# Patient Record
Sex: FEMALE | Race: BLACK OR AFRICAN AMERICAN | NOT HISPANIC OR LATINO | Employment: FULL TIME | URBAN - METROPOLITAN AREA
[De-identification: names, ages, dates, MRNs, and addresses within clinical notes are randomized per-mention and may not be internally consistent; named-entity substitution may affect disease eponyms.]

---

## 2024-07-10 ENCOUNTER — TELEMEDICINE (OUTPATIENT)
Dept: PRIMARY CARE | Facility: CLINIC | Age: 34
End: 2024-07-10
Payer: COMMERCIAL

## 2024-07-10 DIAGNOSIS — R09.89 CHEST CONGESTION: Primary | ICD-10-CM

## 2024-07-10 PROCEDURE — 99203 OFFICE O/P NEW LOW 30 MIN: CPT | Performed by: FAMILY MEDICINE

## 2024-07-10 RX ORDER — PREDNISONE 20 MG/1
40 TABLET ORAL DAILY
Qty: 10 TABLET | Refills: 0 | Status: SHIPPED | OUTPATIENT
Start: 2024-07-10 | End: 2024-07-15

## 2024-07-10 RX ORDER — ALBUTEROL SULFATE 90 UG/1
2 AEROSOL, METERED RESPIRATORY (INHALATION) EVERY 4 HOURS PRN
Qty: 8.5 G | Refills: 0 | Status: SHIPPED | OUTPATIENT
Start: 2024-07-10 | End: 2025-07-10

## 2024-07-10 RX ORDER — AMOXICILLIN AND CLAVULANATE POTASSIUM 875; 125 MG/1; MG/1
875 TABLET, FILM COATED ORAL 2 TIMES DAILY
Qty: 14 TABLET | Refills: 0 | Status: SHIPPED | OUTPATIENT
Start: 2024-07-10 | End: 2024-07-17

## 2024-07-10 NOTE — PROGRESS NOTES
S: chest congestion  Difficult to breath  Coughing with thick sputum  Wheezing  Feverish  Muscle aches  Tested twice for COVID at home: negative  Almost went to ER    Works as nurse, with sick contacts    O: 33 year old female  Appears tired.  Normal breathing pattern,  Walked in living room with no breathing difficulty    A/P.  Differential diagnosis : atypical pneumonia  RAD  Bronchitis with sinusitis   Encouraged to follow up with primary care with in 3 to 5days.

## 2024-08-30 ENCOUNTER — OFFICE VISIT (OUTPATIENT)
Dept: PRIMARY CARE | Facility: CLINIC | Age: 34
End: 2024-08-30
Payer: COMMERCIAL

## 2024-08-30 VITALS
BODY MASS INDEX: 34.78 KG/M2 | TEMPERATURE: 97.8 F | HEART RATE: 96 BPM | DIASTOLIC BLOOD PRESSURE: 78 MMHG | SYSTOLIC BLOOD PRESSURE: 120 MMHG | WEIGHT: 209 LBS | OXYGEN SATURATION: 99 %

## 2024-08-30 DIAGNOSIS — E28.2 PCOS (POLYCYSTIC OVARIAN SYNDROME): ICD-10-CM

## 2024-08-30 DIAGNOSIS — Z00.00 ADULT GENERAL MEDICAL EXAM: Primary | ICD-10-CM

## 2024-08-30 PROCEDURE — 99395 PREV VISIT EST AGE 18-39: CPT | Performed by: INTERNAL MEDICINE

## 2024-08-30 RX ORDER — BUSPIRONE HYDROCHLORIDE 10 MG/1
10 TABLET ORAL 2 TIMES DAILY
COMMUNITY
Start: 2024-08-01

## 2024-08-30 RX ORDER — METHYLPHENIDATE HYDROCHLORIDE 54 MG/1
54 TABLET ORAL EVERY MORNING
COMMUNITY

## 2024-08-30 RX ORDER — METFORMIN HYDROCHLORIDE 1000 MG/1
1000 TABLET ORAL
COMMUNITY
Start: 2024-07-05

## 2024-08-30 ASSESSMENT — PATIENT HEALTH QUESTIONNAIRE - PHQ9
SUM OF ALL RESPONSES TO PHQ9 QUESTIONS 1 AND 2: 0
1. LITTLE INTEREST OR PLEASURE IN DOING THINGS: NOT AT ALL
2. FEELING DOWN, DEPRESSED OR HOPELESS: NOT AT ALL

## 2024-08-30 ASSESSMENT — PAIN SCALES - GENERAL: PAINLEVEL: 0-NO PAIN

## 2024-08-30 NOTE — PROGRESS NOTES
Subjective   Patient ID: Graciela Maher is a 34 y.o. female who presents for Annual Exam.    HPI  1.  Physical exam.  Pt admits to high stress due to school, work, parenting.  Pap: last done 1/2018    Review of Systems  All pertinent positive symptoms are included in the history of present illness.    All other systems have been reviewed and are negative and noncontributory to this patient's current ailments.    Past Medical History:   Diagnosis Date    Other conditions influencing health status     Dysthymic Disorder    Personal history of other mental and behavioral disorders     History of attention deficit hyperactivity disorder     History reviewed. No pertinent surgical history.  Social History     Tobacco Use    Smoking status: Never    Smokeless tobacco: Never     No family history on file.  No Known Allergies  Immunization History   Administered Date(s) Administered    Pfizer Gray Cap SARS-CoV-2 04/05/2022     Current Outpatient Medications   Medication Instructions    busPIRone (BUSPAR) 10 mg, oral, 2 times daily    metFORMIN (GLUCOPHAGE) 1,000 mg, oral, Daily with breakfast    methylphenidate ER 54 mg, oral, Every morning     Objective   Visit Vitals  /78   Pulse 96   Temp 36.6 °C (97.8 °F)   Wt 94.8 kg (209 lb)   SpO2 99%   BMI 34.78 kg/m²   Smoking Status Never   BSA 2.09 m²     No visits with results within 1 Month(s) from this visit.   Latest known visit with results is:   Legacy Encounter on 03/23/2023   Component Date Value    ANTI-NUCLEAR ANTIBODY (A* 03/23/2023                      Value:NEGATIVE  Reference range: NEGATIVE    Anti-nuclear antibody test is used as an aid in diagnosis of systemic  autoimmune diseases. Where positive and clinically warranted,  follow-up using disease-specific testing is recommended. Low positive  titers are not uncommon with advanced age, certain chronic  infections, and malignancies among others.     Test methodology: Indirect fluorescence immunoassay  (IFA) using HEp-2  cells.  Performed By:  [facility]  [address] [place] [address]  : MD KIMBERLY Farooq III#: [id number]  Phone#: [phone]      Calcium 03/23/2023 9.5     AST 03/23/2023 18     Alkaline Phosphatase 03/23/2023 78     Total Bilirubin 03/23/2023 0.6     Total Protein 03/23/2023 7.9     Albumin 03/23/2023 4.3     Globulin, Total 03/23/2023 3.6     A/G Ratio 03/23/2023 1.2 (L)     Sodium 03/23/2023 139     Potassium 03/23/2023 4.1     Chloride 03/23/2023 102     Bicarbonate 03/23/2023 22 (L)     Anion Gap 03/23/2023 15     Urea Nitrogen 03/23/2023 8     Creatinine 03/23/2023 0.7     Urea Nitrogen/Creatinine* 03/23/2023 11.4     Glucose 03/23/2023 108 (H)     ALT (SGPT) 03/23/2023 21     ESTIMATED GFR 03/23/2023 117     CRP 03/23/2023 0.4     DHEA Sulfate 03/23/2023                      Value:304  Reference range: 12  to  379  Unit: ug/dL    MATURITY-BASED REFERENCE RANGES:        PUBERTAL (SIMA) STAGE    MALE      FEMALE       ---------------------------------------------------                 I           5 - 265     5 - 125                  II          15 - 380    15 - 150                 III          60 - 505    20 - 535                            IV          65 - 560    35 - 485                      V         165 - 500    75 - 530       Biotin interference may cause falsely elevated results.   Patients taking a Biotin dose of up to 5 mg/day should   refrain from taking Biotin for 24 hours before sample   collection. Providers may contact their local laboratory  for further information.  Test Performed at:  [facility]), [email or web address] , [place], [place], [address]  :         Differential Type 03/23/2023 AUTO DIFF     Immature Granulocyte %, * 03/23/2023 0.50     Neutrophil 03/23/2023 65.20     Lymphocytes % 03/23/2023 27.80     Monocytes % 03/23/2023 5.20     Eosinophil 03/23/2023 0.70     Basophils % 03/23/2023 0.60     Immature  Granulocytes Ab* 03/23/2023 0.05     Neutrophils Absolute 03/23/2023 6.63     Lymphocytes Absolute 03/23/2023 2.83     Monocytes Absolute 03/23/2023 0.53     Eosinophils Absolute 03/23/2023 0.07     Basophils Absolute 03/23/2023 0.06     WBC 03/23/2023 10.2     RBC 03/23/2023 4.96 (H)     Hemoglobin 03/23/2023 14.1     Hematocrit 03/23/2023 44.9 (H)     MCV 03/23/2023 90.5     MCH 03/23/2023 28.4     MCHC 03/23/2023 31.4     RDW-SD 03/23/2023 40.4     RDW-CV 03/23/2023 12.3     Platelets 03/23/2023 260     MPV 03/23/2023 12.2     nRBC 03/23/2023 0     ABSOLUTE NEUTROPHIL CALC* 03/23/2023 6.63     Hemoglobin A1C 03/23/2023 5.7     Insulin 03/23/2023  (H)                     Value:30   Reference values apply to fasting specimens.  Reference range: 3  to  25  Unit: uIU/mL      SERUM COLOR 03/23/2023 YELLOW     SERUM CLARITY 03/23/2023 CLEAR     Fasting status 03/23/2023 FASTING     Cholesterol 03/23/2023 252 (H)     Triglycerides 03/23/2023 175 (H)     HDL 03/23/2023 51     LDL Calculated 03/23/2023 166 (H)     Cholesterol/HDL Ratio 03/23/2023 4.9     Rheumatoid Factor 03/23/2023 10     Testosterone, Total 03/23/2023  (H)                     Value:86  Reference range: 2 to 45  Unit: ng/dL    For additional information, please refer to  http://education.Aledia/faq/  [id number]  (This link is being provided for informational/  educational purposes only.)     This test was developed and its analytical performance  characteristics have been determined by [employer] [place], [place]. It has  not been cleared or approved by the U.S. Food and Drug  Administration. This assay has been validated pursuant  to the CLIA regulations and is used for clinical  purposes.      Testosterone, Free 03/23/2023  (H)                     Value:14.2  Reference range: 0.1 to 6.4  Unit: pg/mL    This test was developed and its analytical performance  characteristics have been determined by  [employer] [employer] [place], [place]. It has  not been cleared or approved by the U.S. Food and Drug  Administration. This assay has been validated pursuant  to the CLIA regulations and is used for clinical  purposes.  Test Performed at:  ([id number]), , , ,   :         Thyroid Stimulating Horm* 03/23/2023 1.18      Physical Exam  CONSTITUTIONAL - well nourished, well developed, looks like stated age, in no acute distress, not ill-appearing, and not tired appearing  SKIN - normal skin color and pigmentation, normal skin turgor without rash, lesions, or nodules visualized  HEAD - no trauma, normocephalic  EYES - pupils are equal and reactive to light, extraocular muscles are intact, and normal external exam  ENT - TM's intact, no injection, no signs of infection, uvula midline, normal tongue movement and throat normal, no exudate, nasal passage without discharge and patent  NECK - supple without rigidity, no neck mass was observed, no thyromegaly or thyroid nodules  CHEST - clear to auscultation, no wheezing, no crackles and no rales, good effort  CARDIAC - regular rate and regular rhythm, no skipped beats, no murmur  ABDOMEN - no organomegaly, soft, nontender, nondistended, normal bowel sounds, no guarding/rebound/rigidity, negative McBurney sign and negative Rosa sign  EXTREMITIES - no edema, no deformities  NEUROLOGICAL - normal gait, normal balance, normal motor, no ataxia; alert, oriented and no focal signs  PSYCHIATRIC - alert, pleasant and cordial, age-appropriate  IMMUNOLOGIC - no cervical lymphadenopathy    Assessment/Plan   Problem List Items Addressed This Visit    None  Visit Diagnoses       Adult general medical exam    -  Primary    PCOS (polycystic ovarian syndrome)        Relevant Orders    Lipid Panel    Comprehensive Metabolic Panel    CBC    TSH with reflex to Free T4 if abnormal    Hemoglobin A1C         Healthy diet and exercise encouraged. Low fat, low salt  diet. Drink plenty of fluids. Exercise at least 5 times/week for at least 45 minutes per day.

## 2025-04-07 ENCOUNTER — HOSPITAL ENCOUNTER (INPATIENT)
Facility: HOSPITAL | Age: 35
LOS: 2 days | Discharge: HOME | End: 2025-04-09
Attending: EMERGENCY MEDICINE | Admitting: INTERNAL MEDICINE

## 2025-04-07 ENCOUNTER — APPOINTMENT (OUTPATIENT)
Dept: RADIOLOGY | Facility: HOSPITAL | Age: 35
End: 2025-04-07

## 2025-04-07 ENCOUNTER — APPOINTMENT (OUTPATIENT)
Dept: CARDIOLOGY | Facility: HOSPITAL | Age: 35
End: 2025-04-07

## 2025-04-07 DIAGNOSIS — K52.9 ENTEROCOLITIS: ICD-10-CM

## 2025-04-07 DIAGNOSIS — R10.84 GENERALIZED ABDOMINAL PAIN: ICD-10-CM

## 2025-04-07 DIAGNOSIS — R11.2 NAUSEA AND VOMITING, UNSPECIFIED VOMITING TYPE: ICD-10-CM

## 2025-04-07 DIAGNOSIS — K52.9 GASTROENTERITIS: Primary | ICD-10-CM

## 2025-04-07 LAB
ALBUMIN SERPL BCP-MCNC: 4.4 G/DL (ref 3.4–5)
ALP SERPL-CCNC: 70 U/L (ref 33–110)
ALT SERPL W P-5'-P-CCNC: 19 U/L (ref 7–45)
ANION GAP SERPL CALC-SCNC: 12 MMOL/L (ref 10–20)
APPEARANCE UR: ABNORMAL
AST SERPL W P-5'-P-CCNC: 14 U/L (ref 9–39)
B-HCG SERPL-ACNC: <2 MIU/ML
BACTERIA #/AREA URNS AUTO: ABNORMAL /HPF
BASOPHILS # BLD AUTO: 0.04 X10*3/UL (ref 0–0.1)
BASOPHILS NFR BLD AUTO: 0.2 %
BILIRUB SERPL-MCNC: 0.8 MG/DL (ref 0–1.2)
BILIRUB UR STRIP.AUTO-MCNC: NEGATIVE MG/DL
BUN SERPL-MCNC: 10 MG/DL (ref 6–23)
CALCIUM SERPL-MCNC: 9 MG/DL (ref 8.6–10.3)
CHLORIDE SERPL-SCNC: 103 MMOL/L (ref 98–107)
CO2 SERPL-SCNC: 25 MMOL/L (ref 21–32)
COLOR UR: YELLOW
CREAT SERPL-MCNC: 0.66 MG/DL (ref 0.5–1.05)
EGFRCR SERPLBLD CKD-EPI 2021: >90 ML/MIN/1.73M*2
EOSINOPHIL # BLD AUTO: 0.02 X10*3/UL (ref 0–0.7)
EOSINOPHIL NFR BLD AUTO: 0.1 %
ERYTHROCYTE [DISTWIDTH] IN BLOOD BY AUTOMATED COUNT: 12.7 % (ref 11.5–14.5)
FLUAV RNA RESP QL NAA+PROBE: NOT DETECTED
FLUBV RNA RESP QL NAA+PROBE: NOT DETECTED
GLUCOSE SERPL-MCNC: 153 MG/DL (ref 74–99)
GLUCOSE UR STRIP.AUTO-MCNC: NORMAL MG/DL
HCT VFR BLD AUTO: 45.9 % (ref 36–46)
HGB BLD-MCNC: 14.4 G/DL (ref 12–16)
HOLD SPECIMEN: NORMAL
HOLD SPECIMEN: NORMAL
IMM GRANULOCYTES # BLD AUTO: 0.11 X10*3/UL (ref 0–0.7)
IMM GRANULOCYTES NFR BLD AUTO: 0.6 % (ref 0–0.9)
KETONES UR STRIP.AUTO-MCNC: NEGATIVE MG/DL
LACTATE SERPL-SCNC: 1.5 MMOL/L (ref 0.4–2)
LACTATE SERPL-SCNC: 2.4 MMOL/L (ref 0.4–2)
LACTATE SERPL-SCNC: 2.5 MMOL/L (ref 0.4–2)
LACTATE SERPL-SCNC: 2.6 MMOL/L (ref 0.4–2)
LACTATE SERPL-SCNC: 2.9 MMOL/L (ref 0.4–2)
LEUKOCYTE ESTERASE UR QL STRIP.AUTO: ABNORMAL
LIPASE SERPL-CCNC: 28 U/L (ref 9–82)
LYMPHOCYTES # BLD AUTO: 0.4 X10*3/UL (ref 1.2–4.8)
LYMPHOCYTES NFR BLD AUTO: 2.1 %
MAGNESIUM SERPL-MCNC: 1.77 MG/DL (ref 1.6–2.4)
MCH RBC QN AUTO: 28.8 PG (ref 26–34)
MCHC RBC AUTO-ENTMCNC: 31.4 G/DL (ref 32–36)
MCV RBC AUTO: 92 FL (ref 80–100)
MONOCYTES # BLD AUTO: 0.65 X10*3/UL (ref 0.1–1)
MONOCYTES NFR BLD AUTO: 3.4 %
MUCOUS THREADS #/AREA URNS AUTO: ABNORMAL /LPF
NEUTROPHILS # BLD AUTO: 18.04 X10*3/UL (ref 1.2–7.7)
NEUTROPHILS NFR BLD AUTO: 93.6 %
NITRITE UR QL STRIP.AUTO: NEGATIVE
NRBC BLD-RTO: 0 /100 WBCS (ref 0–0)
PH UR STRIP.AUTO: 5.5 [PH]
PLATELET # BLD AUTO: 335 X10*3/UL (ref 150–450)
POTASSIUM SERPL-SCNC: 4.4 MMOL/L (ref 3.5–5.3)
PROT SERPL-MCNC: 7.6 G/DL (ref 6.4–8.2)
PROT UR STRIP.AUTO-MCNC: ABNORMAL MG/DL
RBC # BLD AUTO: 5 X10*6/UL (ref 4–5.2)
RBC # UR STRIP.AUTO: NEGATIVE MG/DL
RBC #/AREA URNS AUTO: ABNORMAL /HPF
RSV RNA RESP QL NAA+PROBE: NOT DETECTED
SARS-COV-2 RNA RESP QL NAA+PROBE: NOT DETECTED
SODIUM SERPL-SCNC: 136 MMOL/L (ref 136–145)
SP GR UR STRIP.AUTO: 1.03
SQUAMOUS #/AREA URNS AUTO: ABNORMAL /HPF
UROBILINOGEN UR STRIP.AUTO-MCNC: NORMAL MG/DL
WBC # BLD AUTO: 19.3 X10*3/UL (ref 4.4–11.3)
WBC #/AREA URNS AUTO: ABNORMAL /HPF
WBC CLUMPS #/AREA URNS AUTO: ABNORMAL /HPF

## 2025-04-07 PROCEDURE — 96365 THER/PROPH/DIAG IV INF INIT: CPT

## 2025-04-07 PROCEDURE — 96374 THER/PROPH/DIAG INJ IV PUSH: CPT

## 2025-04-07 PROCEDURE — 93005 ELECTROCARDIOGRAM TRACING: CPT

## 2025-04-07 PROCEDURE — 2500000002 HC RX 250 W HCPCS SELF ADMINISTERED DRUGS (ALT 637 FOR MEDICARE OP, ALT 636 FOR OP/ED)

## 2025-04-07 PROCEDURE — 99223 1ST HOSP IP/OBS HIGH 75: CPT

## 2025-04-07 PROCEDURE — 96375 TX/PRO/DX INJ NEW DRUG ADDON: CPT

## 2025-04-07 PROCEDURE — 36415 COLL VENOUS BLD VENIPUNCTURE: CPT | Performed by: NURSE PRACTITIONER

## 2025-04-07 PROCEDURE — 2550000001 HC RX 255 CONTRASTS: Performed by: NURSE PRACTITIONER

## 2025-04-07 PROCEDURE — 81001 URINALYSIS AUTO W/SCOPE: CPT | Performed by: NURSE PRACTITIONER

## 2025-04-07 PROCEDURE — 80053 COMPREHEN METABOLIC PANEL: CPT | Performed by: NURSE PRACTITIONER

## 2025-04-07 PROCEDURE — 87040 BLOOD CULTURE FOR BACTERIA: CPT | Mod: PARLAB | Performed by: NURSE PRACTITIONER

## 2025-04-07 PROCEDURE — 2500000004 HC RX 250 GENERAL PHARMACY W/ HCPCS (ALT 636 FOR OP/ED): Performed by: NURSE PRACTITIONER

## 2025-04-07 PROCEDURE — 74177 CT ABD & PELVIS W/CONTRAST: CPT | Performed by: RADIOLOGY

## 2025-04-07 PROCEDURE — 74177 CT ABD & PELVIS W/CONTRAST: CPT

## 2025-04-07 PROCEDURE — 83605 ASSAY OF LACTIC ACID: CPT | Performed by: INTERNAL MEDICINE

## 2025-04-07 PROCEDURE — 83735 ASSAY OF MAGNESIUM: CPT | Performed by: NURSE PRACTITIONER

## 2025-04-07 PROCEDURE — 87637 SARSCOV2&INF A&B&RSV AMP PRB: CPT | Performed by: NURSE PRACTITIONER

## 2025-04-07 PROCEDURE — 83605 ASSAY OF LACTIC ACID: CPT

## 2025-04-07 PROCEDURE — 85025 COMPLETE CBC W/AUTO DIFF WBC: CPT | Performed by: NURSE PRACTITIONER

## 2025-04-07 PROCEDURE — 2500000001 HC RX 250 WO HCPCS SELF ADMINISTERED DRUGS (ALT 637 FOR MEDICARE OP)

## 2025-04-07 PROCEDURE — 87086 URINE CULTURE/COLONY COUNT: CPT | Mod: PARLAB | Performed by: NURSE PRACTITIONER

## 2025-04-07 PROCEDURE — 2500000004 HC RX 250 GENERAL PHARMACY W/ HCPCS (ALT 636 FOR OP/ED)

## 2025-04-07 PROCEDURE — 1200000002 HC GENERAL ROOM WITH TELEMETRY DAILY

## 2025-04-07 PROCEDURE — 84702 CHORIONIC GONADOTROPIN TEST: CPT | Performed by: NURSE PRACTITIONER

## 2025-04-07 PROCEDURE — 96361 HYDRATE IV INFUSION ADD-ON: CPT

## 2025-04-07 PROCEDURE — 83690 ASSAY OF LIPASE: CPT | Performed by: NURSE PRACTITIONER

## 2025-04-07 PROCEDURE — 83605 ASSAY OF LACTIC ACID: CPT | Performed by: NURSE PRACTITIONER

## 2025-04-07 PROCEDURE — 99285 EMERGENCY DEPT VISIT HI MDM: CPT | Mod: 25 | Performed by: EMERGENCY MEDICINE

## 2025-04-07 RX ORDER — ACETAMINOPHEN 160 MG/5ML
650 SOLUTION ORAL EVERY 4 HOURS PRN
Status: DISCONTINUED | OUTPATIENT
Start: 2025-04-07 | End: 2025-04-09 | Stop reason: HOSPADM

## 2025-04-07 RX ORDER — MORPHINE SULFATE 2 MG/ML
2 INJECTION, SOLUTION INTRAMUSCULAR; INTRAVENOUS ONCE
Status: COMPLETED | OUTPATIENT
Start: 2025-04-07 | End: 2025-04-07

## 2025-04-07 RX ORDER — KETOROLAC TROMETHAMINE 30 MG/ML
15 INJECTION, SOLUTION INTRAMUSCULAR; INTRAVENOUS ONCE
Status: COMPLETED | OUTPATIENT
Start: 2025-04-07 | End: 2025-04-07

## 2025-04-07 RX ORDER — PROCHLORPERAZINE EDISYLATE 5 MG/ML
10 INJECTION INTRAMUSCULAR; INTRAVENOUS EVERY 6 HOURS PRN
Status: DISCONTINUED | OUTPATIENT
Start: 2025-04-07 | End: 2025-04-09 | Stop reason: HOSPADM

## 2025-04-07 RX ORDER — ONDANSETRON 4 MG/1
4 TABLET, FILM COATED ORAL EVERY 8 HOURS PRN
COMMUNITY

## 2025-04-07 RX ORDER — ENOXAPARIN SODIUM 100 MG/ML
40 INJECTION SUBCUTANEOUS EVERY 24 HOURS
Status: DISCONTINUED | OUTPATIENT
Start: 2025-04-07 | End: 2025-04-09 | Stop reason: HOSPADM

## 2025-04-07 RX ORDER — ONDANSETRON HYDROCHLORIDE 2 MG/ML
4 INJECTION, SOLUTION INTRAVENOUS ONCE
Status: COMPLETED | OUTPATIENT
Start: 2025-04-07 | End: 2025-04-07

## 2025-04-07 RX ORDER — PROCHLORPERAZINE EDISYLATE 5 MG/ML
5 INJECTION INTRAMUSCULAR; INTRAVENOUS ONCE
Status: DISCONTINUED | OUTPATIENT
Start: 2025-04-07 | End: 2025-04-07

## 2025-04-07 RX ORDER — PROCHLORPERAZINE 25 MG/1
25 SUPPOSITORY RECTAL EVERY 12 HOURS PRN
Status: DISCONTINUED | OUTPATIENT
Start: 2025-04-07 | End: 2025-04-09 | Stop reason: HOSPADM

## 2025-04-07 RX ORDER — PROCHLORPERAZINE MALEATE 10 MG
10 TABLET ORAL EVERY 6 HOURS PRN
Status: DISCONTINUED | OUTPATIENT
Start: 2025-04-07 | End: 2025-04-09 | Stop reason: HOSPADM

## 2025-04-07 RX ORDER — ACETAMINOPHEN 325 MG/1
650 TABLET ORAL EVERY 4 HOURS PRN
Status: DISCONTINUED | OUTPATIENT
Start: 2025-04-07 | End: 2025-04-09 | Stop reason: HOSPADM

## 2025-04-07 RX ORDER — SODIUM CHLORIDE 9 MG/ML
125 INJECTION, SOLUTION INTRAVENOUS CONTINUOUS
Status: ACTIVE | OUTPATIENT
Start: 2025-04-07 | End: 2025-04-08

## 2025-04-07 RX ORDER — PROMETHAZINE HYDROCHLORIDE 25 MG/1
25 TABLET ORAL EVERY 6 HOURS PRN
Status: DISCONTINUED | OUTPATIENT
Start: 2025-04-07 | End: 2025-04-09 | Stop reason: HOSPADM

## 2025-04-07 RX ORDER — KETOROLAC TROMETHAMINE 30 MG/ML
15 INJECTION, SOLUTION INTRAMUSCULAR; INTRAVENOUS ONCE
Status: DISCONTINUED | OUTPATIENT
Start: 2025-04-07 | End: 2025-04-07

## 2025-04-07 RX ORDER — SODIUM CHLORIDE 9 MG/ML
125 INJECTION, SOLUTION INTRAVENOUS CONTINUOUS
Status: DISCONTINUED | OUTPATIENT
Start: 2025-04-07 | End: 2025-04-07

## 2025-04-07 RX ORDER — PROMETHAZINE HYDROCHLORIDE 25 MG/1
25 SUPPOSITORY RECTAL EVERY 12 HOURS PRN
Status: DISCONTINUED | OUTPATIENT
Start: 2025-04-07 | End: 2025-04-09 | Stop reason: HOSPADM

## 2025-04-07 RX ADMIN — MORPHINE SULFATE 2 MG: 2 INJECTION, SOLUTION INTRAMUSCULAR; INTRAVENOUS at 12:33

## 2025-04-07 RX ADMIN — KETOROLAC TROMETHAMINE 15 MG: 30 INJECTION, SOLUTION INTRAMUSCULAR at 10:22

## 2025-04-07 RX ADMIN — SODIUM CHLORIDE 1000 ML: 9 INJECTION, SOLUTION INTRAVENOUS at 08:50

## 2025-04-07 RX ADMIN — PIPERACILLIN SODIUM AND TAZOBACTAM SODIUM 3.38 G: 3; .375 INJECTION, SOLUTION INTRAVENOUS at 22:12

## 2025-04-07 RX ADMIN — SODIUM CHLORIDE 125 ML/HR: 0.9 INJECTION, SOLUTION INTRAVENOUS at 10:09

## 2025-04-07 RX ADMIN — PIPERACILLIN SODIUM AND TAZOBACTAM SODIUM 3.38 G: 3; .375 INJECTION, SOLUTION INTRAVENOUS at 15:31

## 2025-04-07 RX ADMIN — PROMETHAZINE HYDROCHLORIDE 12.5 MG: 25 INJECTION INTRAMUSCULAR; INTRAVENOUS at 12:56

## 2025-04-07 RX ADMIN — SODIUM CHLORIDE 125 ML/HR: 0.9 INJECTION, SOLUTION INTRAVENOUS at 16:31

## 2025-04-07 RX ADMIN — ONDANSETRON 4 MG: 2 INJECTION INTRAMUSCULAR; INTRAVENOUS at 09:11

## 2025-04-07 RX ADMIN — ACETAMINOPHEN 650 MG: 325 TABLET, FILM COATED ORAL at 16:03

## 2025-04-07 RX ADMIN — SODIUM CHLORIDE 1000 ML: 0.9 INJECTION, SOLUTION INTRAVENOUS at 13:35

## 2025-04-07 RX ADMIN — PIPERACILLIN SODIUM AND TAZOBACTAM SODIUM 3.38 G: 3; .375 INJECTION, SOLUTION INTRAVENOUS at 10:30

## 2025-04-07 RX ADMIN — IOHEXOL 75 ML: 350 INJECTION, SOLUTION INTRAVENOUS at 11:05

## 2025-04-07 RX ADMIN — ACETAMINOPHEN 650 MG: 325 TABLET, FILM COATED ORAL at 20:48

## 2025-04-07 RX ADMIN — ENOXAPARIN SODIUM 40 MG: 40 INJECTION SUBCUTANEOUS at 14:52

## 2025-04-07 RX ADMIN — PROMETHAZINE HYDROCHLORIDE 25 MG: 25 TABLET ORAL at 16:04

## 2025-04-07 SDOH — SOCIAL STABILITY: SOCIAL INSECURITY: ARE THERE ANY APPARENT SIGNS OF INJURIES/BEHAVIORS THAT COULD BE RELATED TO ABUSE/NEGLECT?: NO

## 2025-04-07 SDOH — ECONOMIC STABILITY: INCOME INSECURITY: IN THE PAST 12 MONTHS HAS THE ELECTRIC, GAS, OIL, OR WATER COMPANY THREATENED TO SHUT OFF SERVICES IN YOUR HOME?: NO

## 2025-04-07 SDOH — SOCIAL STABILITY: SOCIAL INSECURITY: DO YOU FEEL ANYONE HAS EXPLOITED OR TAKEN ADVANTAGE OF YOU FINANCIALLY OR OF YOUR PERSONAL PROPERTY?: NO

## 2025-04-07 SDOH — ECONOMIC STABILITY: FOOD INSECURITY: WITHIN THE PAST 12 MONTHS, THE FOOD YOU BOUGHT JUST DIDN'T LAST AND YOU DIDN'T HAVE MONEY TO GET MORE.: NEVER TRUE

## 2025-04-07 SDOH — SOCIAL STABILITY: SOCIAL INSECURITY: WITHIN THE LAST YEAR, HAVE YOU BEEN HUMILIATED OR EMOTIONALLY ABUSED IN OTHER WAYS BY YOUR PARTNER OR EX-PARTNER?: NO

## 2025-04-07 SDOH — ECONOMIC STABILITY: FOOD INSECURITY: WITHIN THE PAST 12 MONTHS, YOU WORRIED THAT YOUR FOOD WOULD RUN OUT BEFORE YOU GOT THE MONEY TO BUY MORE.: NEVER TRUE

## 2025-04-07 SDOH — SOCIAL STABILITY: SOCIAL INSECURITY
WITHIN THE LAST YEAR, HAVE YOU BEEN RAPED OR FORCED TO HAVE ANY KIND OF SEXUAL ACTIVITY BY YOUR PARTNER OR EX-PARTNER?: NO

## 2025-04-07 SDOH — SOCIAL STABILITY: SOCIAL INSECURITY
WITHIN THE LAST YEAR, HAVE YOU BEEN KICKED, HIT, SLAPPED, OR OTHERWISE PHYSICALLY HURT BY YOUR PARTNER OR EX-PARTNER?: NO

## 2025-04-07 SDOH — SOCIAL STABILITY: SOCIAL INSECURITY: ABUSE: ADULT

## 2025-04-07 SDOH — ECONOMIC STABILITY: HOUSING INSECURITY: AT ANY TIME IN THE PAST 12 MONTHS, WERE YOU HOMELESS OR LIVING IN A SHELTER (INCLUDING NOW)?: NO

## 2025-04-07 SDOH — SOCIAL STABILITY: SOCIAL INSECURITY: WITHIN THE LAST YEAR, HAVE YOU BEEN AFRAID OF YOUR PARTNER OR EX-PARTNER?: NO

## 2025-04-07 SDOH — SOCIAL STABILITY: SOCIAL INSECURITY: WERE YOU ABLE TO COMPLETE ALL THE BEHAVIORAL HEALTH SCREENINGS?: YES

## 2025-04-07 SDOH — ECONOMIC STABILITY: FOOD INSECURITY: HOW HARD IS IT FOR YOU TO PAY FOR THE VERY BASICS LIKE FOOD, HOUSING, MEDICAL CARE, AND HEATING?: NOT VERY HARD

## 2025-04-07 SDOH — ECONOMIC STABILITY: TRANSPORTATION INSECURITY: IN THE PAST 12 MONTHS, HAS LACK OF TRANSPORTATION KEPT YOU FROM MEDICAL APPOINTMENTS OR FROM GETTING MEDICATIONS?: NO

## 2025-04-07 SDOH — SOCIAL STABILITY: SOCIAL INSECURITY: HAS ANYONE EVER THREATENED TO HURT YOUR FAMILY OR YOUR PETS?: NO

## 2025-04-07 SDOH — ECONOMIC STABILITY: HOUSING INSECURITY: IN THE LAST 12 MONTHS, WAS THERE A TIME WHEN YOU WERE NOT ABLE TO PAY THE MORTGAGE OR RENT ON TIME?: NO

## 2025-04-07 SDOH — SOCIAL STABILITY: SOCIAL INSECURITY: ARE YOU OR HAVE YOU BEEN THREATENED OR ABUSED PHYSICALLY, EMOTIONALLY, OR SEXUALLY BY ANYONE?: NO

## 2025-04-07 SDOH — SOCIAL STABILITY: SOCIAL INSECURITY: HAVE YOU HAD THOUGHTS OF HARMING ANYONE ELSE?: NO

## 2025-04-07 SDOH — SOCIAL STABILITY: SOCIAL INSECURITY: HAVE YOU HAD ANY THOUGHTS OF HARMING ANYONE ELSE?: NO

## 2025-04-07 SDOH — ECONOMIC STABILITY: HOUSING INSECURITY: IN THE PAST 12 MONTHS, HOW MANY TIMES HAVE YOU MOVED WHERE YOU WERE LIVING?: 1

## 2025-04-07 SDOH — SOCIAL STABILITY: SOCIAL INSECURITY: DOES ANYONE TRY TO KEEP YOU FROM HAVING/CONTACTING OTHER FRIENDS OR DOING THINGS OUTSIDE YOUR HOME?: NO

## 2025-04-07 SDOH — SOCIAL STABILITY: SOCIAL INSECURITY: DO YOU FEEL UNSAFE GOING BACK TO THE PLACE WHERE YOU ARE LIVING?: NO

## 2025-04-07 ASSESSMENT — COGNITIVE AND FUNCTIONAL STATUS - GENERAL
DAILY ACTIVITIY SCORE: 24
MOBILITY SCORE: 24
PATIENT BASELINE BEDBOUND: NO

## 2025-04-07 ASSESSMENT — ENCOUNTER SYMPTOMS
EYES NEGATIVE: 1
VOMITING: 1
COUGH: 0
ENDOCRINE NEGATIVE: 1
CHEST TIGHTNESS: 0
ALLERGIC/IMMUNOLOGIC NEGATIVE: 1
PALPITATIONS: 0
NEUROLOGICAL NEGATIVE: 1
MUSCULOSKELETAL NEGATIVE: 1
NAUSEA: 1
PSYCHIATRIC NEGATIVE: 1
CHOKING: 0
CONSTITUTIONAL NEGATIVE: 1
HEMATOLOGIC/LYMPHATIC NEGATIVE: 1

## 2025-04-07 ASSESSMENT — ACTIVITIES OF DAILY LIVING (ADL)
LACK_OF_TRANSPORTATION: NO
WALKS IN HOME: INDEPENDENT
FEEDING YOURSELF: INDEPENDENT
LACK_OF_TRANSPORTATION: NO
BATHING: INDEPENDENT
JUDGMENT_ADEQUATE_SAFELY_COMPLETE_DAILY_ACTIVITIES: YES
PATIENT'S MEMORY ADEQUATE TO SAFELY COMPLETE DAILY ACTIVITIES?: YES
HEARING - RIGHT EAR: FUNCTIONAL
HEARING - LEFT EAR: FUNCTIONAL
ADEQUATE_TO_COMPLETE_ADL: YES
TOILETING: INDEPENDENT
GROOMING: INDEPENDENT
DRESSING YOURSELF: INDEPENDENT

## 2025-04-07 ASSESSMENT — LIFESTYLE VARIABLES
HOW OFTEN DO YOU HAVE 6 OR MORE DRINKS ON ONE OCCASION: NEVER
EVER FELT BAD OR GUILTY ABOUT YOUR DRINKING: NO
PRESCIPTION_ABUSE_PAST_12_MONTHS: NO
HAVE PEOPLE ANNOYED YOU BY CRITICIZING YOUR DRINKING: NO
SUBSTANCE_ABUSE_PAST_12_MONTHS: NO
TOTAL SCORE: 0
HOW MANY STANDARD DRINKS CONTAINING ALCOHOL DO YOU HAVE ON A TYPICAL DAY: PATIENT DOES NOT DRINK
AUDIT-C TOTAL SCORE: 0
HAVE YOU EVER FELT YOU SHOULD CUT DOWN ON YOUR DRINKING: NO
HOW OFTEN DO YOU HAVE A DRINK CONTAINING ALCOHOL: NEVER
AUDIT-C TOTAL SCORE: 0
EVER HAD A DRINK FIRST THING IN THE MORNING TO STEADY YOUR NERVES TO GET RID OF A HANGOVER: NO
SKIP TO QUESTIONS 9-10: 1

## 2025-04-07 ASSESSMENT — PAIN SCALES - GENERAL
PAINLEVEL_OUTOF10: 6
PAINLEVEL_OUTOF10: 6
PAINLEVEL_OUTOF10: 10 - WORST POSSIBLE PAIN
PAINLEVEL_OUTOF10: 0 - NO PAIN

## 2025-04-07 ASSESSMENT — PAIN - FUNCTIONAL ASSESSMENT
PAIN_FUNCTIONAL_ASSESSMENT: 0-10
PAIN_FUNCTIONAL_ASSESSMENT: 0-10

## 2025-04-07 ASSESSMENT — COLUMBIA-SUICIDE SEVERITY RATING SCALE - C-SSRS
6. HAVE YOU EVER DONE ANYTHING, STARTED TO DO ANYTHING, OR PREPARED TO DO ANYTHING TO END YOUR LIFE?: NO
1. IN THE PAST MONTH, HAVE YOU WISHED YOU WERE DEAD OR WISHED YOU COULD GO TO SLEEP AND NOT WAKE UP?: NO
2. HAVE YOU ACTUALLY HAD ANY THOUGHTS OF KILLING YOURSELF?: NO

## 2025-04-07 ASSESSMENT — PATIENT HEALTH QUESTIONNAIRE - PHQ9
SUM OF ALL RESPONSES TO PHQ9 QUESTIONS 1 & 2: 0
2. FEELING DOWN, DEPRESSED OR HOPELESS: NOT AT ALL
1. LITTLE INTEREST OR PLEASURE IN DOING THINGS: NOT AT ALL

## 2025-04-07 ASSESSMENT — PAIN DESCRIPTION - ORIENTATION: ORIENTATION: RIGHT;UPPER

## 2025-04-07 ASSESSMENT — PAIN DESCRIPTION - LOCATION: LOCATION: ABDOMEN

## 2025-04-07 ASSESSMENT — PAIN SCALES - PAIN ASSESSMENT IN ADVANCED DEMENTIA (PAINAD): TOTALSCORE: MEDICATION (SEE MAR)

## 2025-04-07 NOTE — PROGRESS NOTES
Pharmacy Medication History Review    Graciela Maher is a 34 y.o. female admitted for Gastroenteritis. Pharmacy reviewed the patient's [id number] medications and allergies for accuracy.    The list below reflectives the updated PTA list. Please review each medication in order reconciliation for additional clarification and justification.  Prior to Admission medications    Medication Sig Start Date End Date Taking? Authorizing Provider   ondansetron (Zofran) 4 mg tablet Take 1 tablet (4 mg) by mouth every 8 hours if needed for nausea or vomiting.   Yes Historical Provider, MD   busPIRone (Buspar) 10 mg tablet Take 1 tablet (10 mg) by mouth twice a day. 8/1/24  no Historical Provider, MD   metFORMIN (Glucophage) 1,000 mg tablet Take 1 tablet (1,000 mg) by mouth once daily with breakfast. 7/5/24  no Historical Provider, MD   methylphenidate ER 54 mg extended release tablet Take 1 tablet (54 mg) by mouth once daily in the morning.   Yes Historical Provider, MD        The list below reflectives the updated allergy list. Please review each documented allergy for additional clarification and justification.  Allergies  Reviewed by Shannan Carrillo RN on 4/7/2025   No Known Allergies         Below are additional concerns with the patient's PTA list.      Eliana Ladd

## 2025-04-07 NOTE — ED TRIAGE NOTES
PATIENT BIBA WITH COMPLAINTS OF NAUSEA/VOMITING AND ABDOMINAL PAIN. THE PATIENT STATES SHE HAD A MISCARRIAGE ABOUT 1 WEEK AGO. THE PATIENT TOOK ORAL ZOFRAN LAST NIGHT WITH NO RELIEF. EMS GAVE HER 4 MG OF IV ZOFRAN. THE PATIENT IS ALERT AND ORIENTED UPON ARRIVAL. DENIES SOB AND CHEST PAIIN

## 2025-04-07 NOTE — H&P
History Of Present Illness  Graciela Maher is a 34 y.o. female with past medical history 1 prior , ADHD, generalized anxiety disorder, PCOS presenting with bilious vomiting and abdominal pain since yesterday.  Patient denies any sick contacts, any changes in her diet.  She is actively vomiting in the ED and history is challenging to obtain secondary to this.  Per chart review patient thinks she may have had a miscarriage last week as she had heavy bleeding.  She has not followed with OB for that pregnancy.  In the ED she is tachycardic with a heart rate of 122 respiratory rate of 30, blood pressure 120/83, afebrile.  EKG shows sinus tachycardia with QTc of 453.  Labs show elevated glucose of 153, lactate of 2.9, trended down to 2.4 in the ED, white blood cell count 19.3 with elevated neutrophils.  Blood cultures were obtained and are pending.  Flu RSV and COVID-negative.  Urinalysis showed 75 leukocytes, 11-20 white blood cells, 1+ bacteria.  CT abdomen and pelvis with IV contrast demonstrated liver steatosis with a normal-appearing nondistended gallbladder, borderline prominent fluid content within the GI tract consistent with enterocolitis.  Patient was given a dose of Zosyn, Toradol, morphine, Zofran, Phenergan, 2 l bolus of normal saline and will be admitted for further treatment.     Past Medical History  Past Medical History:   Diagnosis Date    Other conditions influencing health status     Dysthymic Disorder    Personal history of other mental and behavioral disorders     History of attention deficit hyperactivity disorder       Surgical History  C section     Social History  Denies smoking, lives at home with children.     Family History  Denies     Allergies  Patient has no known allergies.    Review of Systems   Constitutional: Negative.    HENT: Negative.     Eyes: Negative.    Respiratory:  Negative for cough, choking and chest tightness.    Cardiovascular:  Negative for chest pain,  "palpitations and leg swelling.   Gastrointestinal:  Positive for nausea and vomiting.   Endocrine: Negative.    Genitourinary: Negative.    Musculoskeletal: Negative.    Skin: Negative.    Allergic/Immunologic: Negative.    Neurological: Negative.    Hematological: Negative.    Psychiatric/Behavioral: Negative.          Physical Exam  Constitutional:       Comments: Uncomfortable appearing, actively vomiting clear yellow emesis   HENT:      Head: Normocephalic and atraumatic.      Mouth/Throat:      Mouth: Mucous membranes are moist.   Eyes:      Extraocular Movements: Extraocular movements intact.      Pupils: Pupils are equal, round, and reactive to light.   Cardiovascular:      Rate and Rhythm: Regular rhythm. Tachycardia present.   Pulmonary:      Effort: Pulmonary effort is normal. No respiratory distress.   Abdominal:      General: Bowel sounds are normal.      Comments: Nontender to deep palpation with stethoscope in all 4 quadrants   Musculoskeletal:         General: No deformity or signs of injury.   Skin:     General: Skin is warm and dry.      Capillary Refill: Capillary refill takes 2 to 3 seconds.   Neurological:      General: No focal deficit present.      Cranial Nerves: No cranial nerve deficit.   Psychiatric:         Mood and Affect: Mood normal.         Behavior: Behavior normal.          Last Recorded Vitals  Blood pressure 127/68, pulse (!) 112, temperature 37.4 °C (99.3 °F), temperature source Temporal, resp. rate 18, height 1.6 m (5' 3\"), weight 95.3 kg (210 lb), SpO2 100%.    Relevant Results  CT abdomen pelvis w IV contrast    Result Date: 4/7/2025  Interpreted By:  Bulmaro Valle, STUDY: CT ABDOMEN PELVIS W IV CONTRAST;  4/7/2025 11:04 am   INDICATION: Signs/Symptoms:Abdominal pain, significant leukocytosis.     Signs/Symptoms:Abdominal pain, significant leukocytosis   COMPARISON: September 27, 2016 pelvic ultrasound   ACCESSION NUMBER(S): [id number]   ORDERING CLINICIAN: AZEB MITTAL   " TECHNIQUE: CT of the abdomen and pelvis was performed.  Standard contiguous axial images were obtained at 3 mm slice thickness through the abdomen and pelvis. Coronal and sagittal reconstructions at 3 mm slice thickness were performed.  75 ML of Omnipaque 350 was administered intravenously without immediate complication.   FINDINGS: Likely technically adequate although motion related image degradation.   LOWER CHEST: No significant abnormality.   ABDOMEN:   LIVER: Steatosis. Normal morphology without evident mass.   BILE DUCTS: Normal caliber.   GALLBLADDER: The gallbladder is nondistended and without evidence of radiopaque stones.   PANCREAS: No significant abnormality.   SPLEEN: No significant abnormality.   ADRENAL GLANDS: No significant abnormality.   URINARY TRACT: No significant abnormality.   REPRODUCTIVE ORGANS: No significant abnormality.   BOWEL: Nondilated without wall thickening, pneumatosis, or surrounding inflammatory change. Normal appendix. Borderline prominent fluid content within the GI tract.   VESSELS: The aorta and IVC appear normal.   PERITONEUM/RETROPERITONEUM/LYMPH NODES: No ascites or free air, no fluid collection.  No abdominopelvic lymphadenopathy is present.   BONES AND ABDOMINAL WALL: No significant osseous abnormality. Asymmetric linear density ventral abdominal wall image 201/107 favoring asymmetric vessels or scar.       1. Borderline prominent fluid content within the GI tract which could indicate nonspecific enterocolitis. 2. Liver steatosis.     MACRO: None   Signed by: Bulmaro Valle 4/7/2025 11:43 AM Dictation workstation:   [id number]      Results for orders placed or performed during the hospital encounter of 04/07/25 (from the past 24 hours)   CBC and Auto Differential   Result Value Ref Range    WBC 19.3 (H) 4.4 - 11.3 x10*3/uL    nRBC 0.0 0.0 - 0.0 /100 WBCs    RBC 5.00 4.00 - 5.20 x10*6/uL    Hemoglobin 14.4 12.0 - 16.0 g/dL    Hematocrit 45.9 36.0 - 46.0 %    MCV 92 80  - 100 fL    MCH 28.8 26.0 - 34.0 pg    MCHC 31.4 (L) 32.0 - 36.0 g/dL    RDW 12.7 11.5 - 14.5 %    Platelets 335 150 - 450 x10*3/uL    Neutrophils % 93.6 40.0 - 80.0 %    Immature Granulocytes %, Automated 0.6 0.0 - 0.9 %    Lymphocytes % 2.1 13.0 - 44.0 %    Monocytes % 3.4 2.0 - 10.0 %    Eosinophils % 0.1 0.0 - 6.0 %    Basophils % 0.2 0.0 - 2.0 %    Neutrophils Absolute 18.04 (H) 1.20 - 7.70 x10*3/uL    Immature Granulocytes Absolute, Automated 0.11 0.00 - 0.70 x10*3/uL    Lymphocytes Absolute 0.40 (L) 1.20 - 4.80 x10*3/uL    Monocytes Absolute 0.65 0.10 - 1.00 x10*3/uL    Eosinophils Absolute 0.02 0.00 - 0.70 x10*3/uL    Basophils Absolute 0.04 0.00 - 0.10 x10*3/uL   Magnesium   Result Value Ref Range    Magnesium 1.77 1.60 - 2.40 mg/dL   Comprehensive metabolic panel   Result Value Ref Range    Glucose 153 (H) 74 - 99 mg/dL    Sodium 136 136 - 145 mmol/L    Potassium 4.4 3.5 - 5.3 mmol/L    Chloride 103 98 - 107 mmol/L    Bicarbonate 25 21 - 32 mmol/L    Anion Gap 12 10 - 20 mmol/L    Urea Nitrogen 10 6 - 23 mg/dL    Creatinine 0.66 0.50 - 1.05 mg/dL    eGFR >90 >60 mL/min/1.73m*2    Calcium 9.0 8.6 - 10.3 mg/dL    Albumin 4.4 3.4 - 5.0 g/dL    Alkaline Phosphatase 70 33 - 110 U/L    Total Protein 7.6 6.4 - 8.2 g/dL    AST 14 9 - 39 U/L    Bilirubin, Total 0.8 0.0 - 1.2 mg/dL    ALT 19 7 - 45 U/L   Lipase   Result Value Ref Range    Lipase 28 9 - 82 U/L   hCG, quantitative, pregnancy   Result Value Ref Range    HCG, Beta-Quantitative <2 <5 mIU/mL   Sars-CoV-2, Influenza A/B and RSV PCR   Result Value Ref Range    Coronavirus 2019, PCR Not Detected Not Detected    Flu A Result Not Detected Not Detected    Flu B Result Not Detected Not Detected    RSV PCR Not Detected Not Detected   Urinalysis with Reflex Culture and Microscopic   Result Value Ref Range    Color, Urine Yellow Light-Yellow, Yellow, Dark-Yellow    Appearance, Urine Turbid (N) Clear    Specific Gravity, Urine 1.027 1.005 - 1.035    pH, Urine 5.5  5.0, 5.5, 6.0, 6.5, 7.0, 7.5, 8.0    Protein, Urine 20 (TRACE) NEGATIVE, 10 (TRACE), 20 (TRACE) mg/dL    Glucose, Urine Normal Normal mg/dL    Blood, Urine NEGATIVE NEGATIVE mg/dL    Ketones, Urine NEGATIVE NEGATIVE mg/dL    Bilirubin, Urine NEGATIVE NEGATIVE mg/dL    Urobilinogen, Urine Normal Normal mg/dL    Nitrite, Urine NEGATIVE NEGATIVE    Leukocyte Esterase, Urine 75 Justyn/uL (A) NEGATIVE   Microscopic Only, Urine   Result Value Ref Range    WBC, Urine 11-20 (A) 1-5, NONE /HPF    WBC Clumps, Urine FEW Reference range not established. /HPF    RBC, Urine 3-5 NONE, 1-2, 3-5 /HPF    Squamous Epithelial Cells, Urine 10-25 (FEW) Reference range not established. /HPF    Bacteria, Urine 1+ (A) NONE SEEN /HPF    Mucus, Urine 1+ Reference range not established. /LPF   Lactate   Result Value Ref Range    Lactate 2.9 (H) 0.4 - 2.0 mmol/L   Lactate   Result Value Ref Range    Lactate 2.6 (H) 0.4 - 2.0 mmol/L   Lactate   Result Value Ref Range    Lactate 2.4 (H) 0.4 - 2.0 mmol/L         Assessment/Plan   Assessment & Plan  Gastroenteritis      Graciela Maher is a 34 y.o. female with past medical history 1 prior , ADHD, generalized anxiety disorder, PCOS presenting with bilious vomiting and abdominal pain since yesterday.  Patient denies any sick contacts, any changes in her diet.  She is actively vomiting in the ED and history is challenging to obtain secondary to this.  Per chart review patient thinks she may have had a miscarriage last week as she had heavy bleeding.    #Sepsis secondary to enterocolitis & cystitis   #Leukocytosis secondary to above  #Lactic acidosis secondary to above  #Abdominal pain secondary to above  #Uncontrollable vomiting  #Sinus tachycardia secondary to sepsis    - Cover with Zosyn, IV fluids, advance diet as tolerated  - Trend CBC and lactic acid  - Monitor telemetry, expect sinus tachycardia improve as the patient receives treatment for her infection  -DVT prophylaxis with  Lovenox  -Patient thinks she may have had a miscarriage last week, pregnancy test is negative in the ED, monitor blood counts    Chronic conditions:  #Generalized anxiety disorder  #History of   #Prior history of metformin use  - Last A1c 5.7 on 3/7/2024, patient no longer taking metformin at this time, monitor morning sugars with BMP      Romeo Roa, DO

## 2025-04-07 NOTE — ED PROVIDER NOTES
Limitations to History: None     HPI:      Graciela Maher is a 34 y.o. with significant PMH for ADHD, PCOS and 1 prior  presenting to ED today from home by herself via EMS for evaluation of abdominal pain.  Patient states her LMP was early March, she took a home pregnancy test that was positive.  1 week ago the patient had very heavy bleeding and she believes she had a miscarriage.  Symptoms resolved.  Reports being a G3, P1 SAB 2.  This morning the patient developed severe periumbilical pain that is currently rated 8/10 with nausea, multiple episodes of vomiting and loose nonbloody stools.  No sick contacts, tainted food or recent travel.  Zofran PTA per EMS provided mild relief of symptoms.  Denies fever/chills, cough/cold symptoms, chest pain, shortness of breath, dysuria/hematuria, bloody/black bowel movements or any other complaints.  Vapes, occasional EtOH, no IV drug use.  PCP is Dr. Ramos at [facility].    Additional History Obtained from: Triage/nursing notes reviewed    ------------------------------------------------------------------------------------------------------------------------------------------    VS: As documented in the triage note and EMR flowsheet from this visit were reviewed.    Physical Exam:  Gen: 34-year-old female, awake and alert, oriented x 3.  Well-nourished.  Lips slightly dry.  Appears uncomfortable but nontoxic.  Head/Neck: NCAT, neck w/ FROM  Eyes: EOMI, PERRL, anicteric sclerae, noninjected conjunctivae  Ears: TMs clear b/l without sign of infection  Nose: Nares patent w/o rhinorrhea  Mouth:  MMM, no OP lesions noted  Heart: Tachycardic.  Lungs: CTA b/l no RRW, no increased work of breathing  Abdomen: Obese and soft with bowel sounds, periumbilical tenderness and right lower quadrant tenderness.  Bowel sounds present.  No rebound.  Guarding.  No palpable organomegaly.  No CVA tenderness.  Musculoskeletal: SOFIA x 4.  MSPs intact.  Skin intact.  No  deformities  Neurologic: Alert, symmetrical facies, phonates clearly, moves all extremities equally, responsive to touch, ambulates normally   Skin: Pink, warm and dry.  No erythema, edema or ecchymosis.  No rashes noted  Psychological: calm, no SI/HI      ------------------------------------------------------------------------------------------------------------------------------------------    Medical Decision Makin y.o. with significant PMH for ADHD, PCOS and 1 prior  who is evaluated at the bedside for periumbilical and right lower quadrant pain that began this morning with nausea/vomiting and loose nonbloody diarrhea.  Patient thinks he may have had a miscarriage last week, had not followed with OB for that pregnancy.  On arrival blood pressure 119/64, tachycardic 115.  Not hypoxic or febrile.  Abdomen is obese and soft, tender in the periumbilical/right lower quadrant regions with some guarding.     Differential includes but is not limited to retained products, electrolyte derangement, viral illness and appendicitis.    IV established, continuous cardiac/O2 sat monitoring.  Basic lab, EKG, UA/urine culture, beta quant and viral panel.  Will verify pregnancy before ordering imaging.  1 L normal saline IV wide open with IV Zofran.      ED Course as of 25 1259   Mon 2025   1000 Laboratory studies were reviewed, significant leukocytosis of 19.3 with a left shift.  Hemoglobin stable.  Normal kidney function, LFTs and electrolytes including magnesium.  Lipase normal.  Pregnancy negative.  Lactate elevated 2.9, will be reevaluated after fluids.  Viral panel is negative.  Urine shows mild infection with leukocyte esterase, white cells and bacteria.  Urine culture pending.  Will proceed with CT abdomen/pelvis with contrast.  Meets sepsis criteria with lactate and leukocytosis.  Blood cultures obtained.  Covered with IV Zosyn.  IV Toradol for pain. [SB]   1148 CT abdomen/pelvis shows  borderline prominent fluid content within the GI tract which could indicate nonspecific enterocolitis.  Liver steatosis is noted.  continues to complain of nausea despite 2 doses of Zofran, will give IV Phenergan as Zofran has been ineffective.  Medicated for pain with IV morphine.  With the patient enterocolitis, persistent nausea/vomiting, significant leukocytosis, elevated lactate and mild UTI, I feel she would benefit from admission.  No doc paged for admission.  [SB]   1249 I spoke with Dr. Martins of the hospitalist service, she agrees to full admission to [facility].  Diagnosis, treatment plan for admission discussed with patient, she verbalizes understanding and is in agreement.  Heart rate remains in the low 100s, normotensive.  Will be given an additional liter of normal saline.  Lactate trending down to 2.4 after initial liter of fluids.  Condition fair. [SB]      ED Course User Index  [SB] ILAM Antonio-CNP         Diagnoses as of 04/07/25 1259   Generalized abdominal pain   Enterocolitis   Nausea and vomiting, unspecified vomiting type   Gastroenteritis       EKG interpreted by Dr. Klerman at 1254, sinus tachycardia rate of 116, no ST segment depression or elevation consistent with ischemia or infarction.    Chronic Medical Conditions Significantly Affecting Care: None    External Records Reviewed: I reviewed recent and relevant outside records including: None    Discussion of Management with Other Providers: Seen and evaluated with ED attending physician, Dr. Lind, he agrees with the treatment plan of final disposition of the patient.    I discussed the patient/results with: Case discussed with Dr. Alonzo regarding admission.       LIAM Antonio-SERA  04/07/25 1259

## 2025-04-08 ENCOUNTER — TELEPHONE (OUTPATIENT)
Dept: GASTROENTEROLOGY | Facility: CLINIC | Age: 35
End: 2025-04-08

## 2025-04-08 LAB
ANION GAP SERPL CALC-SCNC: 11 MMOL/L (ref 10–20)
BACTERIA UR CULT: NORMAL
BASOPHILS # BLD AUTO: 0.01 X10*3/UL (ref 0–0.1)
BASOPHILS NFR BLD AUTO: 0.1 %
BUN SERPL-MCNC: 7 MG/DL (ref 6–23)
CALCIUM SERPL-MCNC: 7.4 MG/DL (ref 8.6–10.3)
CHLORIDE SERPL-SCNC: 107 MMOL/L (ref 98–107)
CO2 SERPL-SCNC: 23 MMOL/L (ref 21–32)
CREAT SERPL-MCNC: 0.71 MG/DL (ref 0.5–1.05)
EGFRCR SERPLBLD CKD-EPI 2021: >90 ML/MIN/1.73M*2
EOSINOPHIL # BLD AUTO: 0 X10*3/UL (ref 0–0.7)
EOSINOPHIL NFR BLD AUTO: 0 %
ERYTHROCYTE [DISTWIDTH] IN BLOOD BY AUTOMATED COUNT: 13 % (ref 11.5–14.5)
GLUCOSE SERPL-MCNC: 105 MG/DL (ref 74–99)
HCT VFR BLD AUTO: 37.5 % (ref 36–46)
HGB BLD-MCNC: 11.3 G/DL (ref 12–16)
HOLD SPECIMEN: NORMAL
IMM GRANULOCYTES # BLD AUTO: 0.04 X10*3/UL (ref 0–0.7)
IMM GRANULOCYTES NFR BLD AUTO: 0.5 % (ref 0–0.9)
IRON SATN MFR SERPL: 7 % (ref 25–45)
IRON SERPL-MCNC: 20 UG/DL (ref 35–150)
LYMPHOCYTES # BLD AUTO: 1.46 X10*3/UL (ref 1.2–4.8)
LYMPHOCYTES NFR BLD AUTO: 17.5 %
MAGNESIUM SERPL-MCNC: 1.7 MG/DL (ref 1.6–2.4)
MCH RBC QN AUTO: 28.3 PG (ref 26–34)
MCHC RBC AUTO-ENTMCNC: 30.1 G/DL (ref 32–36)
MCV RBC AUTO: 94 FL (ref 80–100)
MONOCYTES # BLD AUTO: 0.71 X10*3/UL (ref 0.1–1)
MONOCYTES NFR BLD AUTO: 8.5 %
NEUTROPHILS # BLD AUTO: 6.12 X10*3/UL (ref 1.2–7.7)
NEUTROPHILS NFR BLD AUTO: 73.4 %
NRBC BLD-RTO: 0 /100 WBCS (ref 0–0)
PLATELET # BLD AUTO: 262 X10*3/UL (ref 150–450)
POTASSIUM SERPL-SCNC: 3.1 MMOL/L (ref 3.5–5.3)
RBC # BLD AUTO: 3.99 X10*6/UL (ref 4–5.2)
SODIUM SERPL-SCNC: 138 MMOL/L (ref 136–145)
TIBC SERPL-MCNC: 286 UG/DL (ref 240–445)
UIBC SERPL-MCNC: 266 UG/DL (ref 110–370)
WBC # BLD AUTO: 8.3 X10*3/UL (ref 4.4–11.3)

## 2025-04-08 PROCEDURE — 82728 ASSAY OF FERRITIN: CPT | Mod: PARLAB | Performed by: NURSE PRACTITIONER

## 2025-04-08 PROCEDURE — 1200000002 HC GENERAL ROOM WITH TELEMETRY DAILY

## 2025-04-08 PROCEDURE — 87506 IADNA-DNA/RNA PROBE TQ 6-11: CPT | Mod: PARLAB

## 2025-04-08 PROCEDURE — 36415 COLL VENOUS BLD VENIPUNCTURE: CPT | Performed by: STUDENT IN AN ORGANIZED HEALTH CARE EDUCATION/TRAINING PROGRAM

## 2025-04-08 PROCEDURE — 87493 C DIFF AMPLIFIED PROBE: CPT | Mod: PARLAB

## 2025-04-08 PROCEDURE — 80048 BASIC METABOLIC PNL TOTAL CA: CPT

## 2025-04-08 PROCEDURE — 2500000001 HC RX 250 WO HCPCS SELF ADMINISTERED DRUGS (ALT 637 FOR MEDICARE OP)

## 2025-04-08 PROCEDURE — 83735 ASSAY OF MAGNESIUM: CPT | Performed by: STUDENT IN AN ORGANIZED HEALTH CARE EDUCATION/TRAINING PROGRAM

## 2025-04-08 PROCEDURE — 99232 SBSQ HOSP IP/OBS MODERATE 35: CPT | Performed by: STUDENT IN AN ORGANIZED HEALTH CARE EDUCATION/TRAINING PROGRAM

## 2025-04-08 PROCEDURE — 2500000004 HC RX 250 GENERAL PHARMACY W/ HCPCS (ALT 636 FOR OP/ED)

## 2025-04-08 PROCEDURE — 99223 1ST HOSP IP/OBS HIGH 75: CPT | Performed by: INTERNAL MEDICINE

## 2025-04-08 PROCEDURE — 36415 COLL VENOUS BLD VENIPUNCTURE: CPT

## 2025-04-08 PROCEDURE — 83540 ASSAY OF IRON: CPT | Performed by: NURSE PRACTITIONER

## 2025-04-08 PROCEDURE — 85025 COMPLETE CBC W/AUTO DIFF WBC: CPT

## 2025-04-08 RX ORDER — SODIUM CHLORIDE 9 MG/ML
125 INJECTION, SOLUTION INTRAVENOUS CONTINUOUS
Status: DISCONTINUED | OUTPATIENT
Start: 2025-04-08 | End: 2025-04-09

## 2025-04-08 RX ORDER — POTASSIUM CHLORIDE 14.9 MG/ML
20 INJECTION INTRAVENOUS
Status: COMPLETED | OUTPATIENT
Start: 2025-04-08 | End: 2025-04-08

## 2025-04-08 RX ADMIN — PROCHLORPERAZINE EDISYLATE 10 MG: 5 INJECTION INTRAMUSCULAR; INTRAVENOUS at 04:18

## 2025-04-08 RX ADMIN — SODIUM CHLORIDE 125 ML/HR: 0.9 INJECTION, SOLUTION INTRAVENOUS at 01:53

## 2025-04-08 RX ADMIN — ACETAMINOPHEN 650 MG: 325 TABLET, FILM COATED ORAL at 21:49

## 2025-04-08 RX ADMIN — PIPERACILLIN SODIUM AND TAZOBACTAM SODIUM 3.38 G: 3; .375 INJECTION, SOLUTION INTRAVENOUS at 10:29

## 2025-04-08 RX ADMIN — ACETAMINOPHEN 650 MG: 325 TABLET, FILM COATED ORAL at 04:18

## 2025-04-08 RX ADMIN — POTASSIUM CHLORIDE 20 MEQ: 14.9 INJECTION, SOLUTION INTRAVENOUS at 11:50

## 2025-04-08 RX ADMIN — PIPERACILLIN SODIUM AND TAZOBACTAM SODIUM 3.38 G: 3; .375 INJECTION, SOLUTION INTRAVENOUS at 04:24

## 2025-04-08 RX ADMIN — ACETAMINOPHEN 650 MG: 325 TABLET, FILM COATED ORAL at 13:57

## 2025-04-08 RX ADMIN — SODIUM CHLORIDE 125 ML/HR: 9 INJECTION, SOLUTION INTRAVENOUS at 20:42

## 2025-04-08 RX ADMIN — POTASSIUM CHLORIDE 20 MEQ: 14.9 INJECTION, SOLUTION INTRAVENOUS at 09:19

## 2025-04-08 RX ADMIN — SODIUM CHLORIDE 125 ML/HR: 9 INJECTION, SOLUTION INTRAVENOUS at 10:25

## 2025-04-08 RX ADMIN — PIPERACILLIN SODIUM AND TAZOBACTAM SODIUM 3.38 G: 3; .375 INJECTION, SOLUTION INTRAVENOUS at 21:48

## 2025-04-08 RX ADMIN — PIPERACILLIN SODIUM AND TAZOBACTAM SODIUM 3.38 G: 3; .375 INJECTION, SOLUTION INTRAVENOUS at 15:32

## 2025-04-08 RX ADMIN — SODIUM CHLORIDE 125 ML/HR: 9 INJECTION, SOLUTION INTRAVENOUS at 20:39

## 2025-04-08 ASSESSMENT — PAIN SCALES - GENERAL
PAINLEVEL_OUTOF10: 0 - NO PAIN
PAINLEVEL_OUTOF10: 5 - MODERATE PAIN
PAINLEVEL_OUTOF10: 6
PAINLEVEL_OUTOF10: 6
PAINLEVEL_OUTOF10: 0 - NO PAIN
PAINLEVEL_OUTOF10: 0 - NO PAIN

## 2025-04-08 ASSESSMENT — PAIN - FUNCTIONAL ASSESSMENT
PAIN_FUNCTIONAL_ASSESSMENT: 0-10

## 2025-04-08 ASSESSMENT — PAIN DESCRIPTION - LOCATION
LOCATION: HEAD
LOCATION: ABDOMEN

## 2025-04-08 ASSESSMENT — ACTIVITIES OF DAILY LIVING (ADL): LACK_OF_TRANSPORTATION: NO

## 2025-04-08 NOTE — PROGRESS NOTES
Occupational Therapy                     Therapy Communication Note    Patient Name: Graciela Maher  MRN: [id number]  Department: [facility]  Room: 603/603-A  Today's Date: 4/8/2025     Discipline: Occupational Therapy           Missed Visit Reason:  (OT screen: Per conference with RN, patient has been independent with all ADLs/functional mobility; No OT needs; Discharge OT orders.)    Missed Time: Attempt

## 2025-04-08 NOTE — PROGRESS NOTES
Graciela Maher is a 34 y.o. female on day 1 of admission presenting with Gastroenteritis.    Plan: admitted from home for abdominal pain, nausea/vomiting, started on IV abx for sepsis enterocolitis, on clear liquid diet. Attempted multiple times to call and discuss DC planning for patient, unable to reach at this time. Anticipate home with no needs, did reach out to  to follow up with insurance as it looks like patient does not have any active insurance on file.   Disposition: Home with family  Barrier: iv abx, tolerate PO diet  ADOD:  1-2 days       04/08/25 0910   Discharge Planning   Living Arrangements Family members   Support Systems Family members   Assistance Needed independent   Type of Residence Private residence   Who is requesting discharge planning? Provider   Home or Post Acute Services None   Expected Discharge Disposition Home   Does the patient need discharge transport arranged? No   Financial Resource Strain   How hard is it for you to pay for the very basics like food, housing, medical care, and heating? Not very   Housing Stability   In the last 12 months, was there a time when you were not able to pay the mortgage or rent on time? N   In the past 12 months, how many times have you moved where you were living? 1   At any time in the past 12 months, were you homeless or living in a shelter (including now)? N   Transportation Needs   In the past 12 months, has lack of transportation kept you from medical appointments or from getting medications? no   In the past 12 months, has lack of transportation kept you from meetings, work, or from getting things needed for daily living? No   Patient Choice   Patient / Family choosing to utilize agency / facility established prior to hospitalization No   Stroke Family Assessment   Stroke Family Assessment Needed No   Intensity of Service   Intensity of Service 0-30 min       Tammy Bruno RN

## 2025-04-08 NOTE — PROGRESS NOTES
"Graciela Maher is a 34 y.o. female on day 1 of admission presenting with Gastroenteritis.    Subjective   Vomiting has stopped, had diarrhea this morning. Groggy and tired.        Objective     Physical Exam  Constitutional:       Comments: Uncomfortable appearing, actively vomiting clear yellow emesis   HENT:      Head: Normocephalic and atraumatic.      Mouth/Throat:      Mouth: Mucous membranes are moist.   Eyes:      Extraocular Movements: Extraocular movements intact.      Pupils: Pupils are equal, round, and reactive to light.   Cardiovascular:      Rate and Rhythm: Regular rhythm. Tachycardia present.   Pulmonary:      Effort: Pulmonary effort is normal. No respiratory distress.   Abdominal:      General: Bowel sounds are normal.      Comments: Nontender to deep palpation with stethoscope in all 4 quadrants   Musculoskeletal:         General: No deformity or signs of injury.   Skin:     General: Skin is warm and dry.      Capillary Refill: Capillary refill takes 2 to 3 seconds.   Neurological:      General: No focal deficit present.      Cranial Nerves: No cranial nerve deficit.   Psychiatric:         Mood and Affect: Mood normal.         Behavior: Behavior normal.     Last Recorded Vitals  Blood pressure 101/62, pulse 91, temperature 36.5 °C (97.7 °F), resp. rate 18, height 1.6 m (5' 3\"), weight 95.3 kg (210 lb), SpO2 96%.  Intake/Output last 3 Shifts:  I/O last 3 completed shifts:  In: 1150.5 (12.1 mL/kg) [IV Piggyback:1150.5]  Out: - (0 mL/kg)   Weight: 95.3 kg     Relevant Results  ECG 12 lead    Result Date: 4/8/2025  Sinus tachycardia Nonspecific ST and T wave abnormality Abnormal ECG When compared with ECG of 23-JAN-2019 06:30, No significant change was found    CT abdomen pelvis w IV contrast    Result Date: 4/7/2025  Interpreted By:  Bulmaro Valle, STUDY: CT ABDOMEN PELVIS W IV CONTRAST;  4/7/2025 11:04 am   INDICATION: Signs/Symptoms:Abdominal pain, significant leukocytosis.     " Signs/Symptoms:Abdominal pain, significant leukocytosis   COMPARISON: September 27, 2016 pelvic ultrasound   ACCESSION NUMBER(S): [id number]   ORDERING CLINICIAN: AZEB MITTAL   TECHNIQUE: CT of the abdomen and pelvis was performed.  Standard contiguous axial images were obtained at 3 mm slice thickness through the abdomen and pelvis. Coronal and sagittal reconstructions at 3 mm slice thickness were performed.  75 ML of Omnipaque 350 was administered intravenously without immediate complication.   FINDINGS: Likely technically adequate although motion related image degradation.   LOWER CHEST: No significant abnormality.   ABDOMEN:   LIVER: Steatosis. Normal morphology without evident mass.   BILE DUCTS: Normal caliber.   GALLBLADDER: The gallbladder is nondistended and without evidence of radiopaque stones.   PANCREAS: No significant abnormality.   SPLEEN: No significant abnormality.   ADRENAL GLANDS: No significant abnormality.   URINARY TRACT: No significant abnormality.   REPRODUCTIVE ORGANS: No significant abnormality.   BOWEL: Nondilated without wall thickening, pneumatosis, or surrounding inflammatory change. Normal appendix. Borderline prominent fluid content within the GI tract.   VESSELS: The aorta and IVC appear normal.   PERITONEUM/RETROPERITONEUM/LYMPH NODES: No ascites or free air, no fluid collection.  No abdominopelvic lymphadenopathy is present.   BONES AND ABDOMINAL WALL: No significant osseous abnormality. Asymmetric linear density ventral abdominal wall image 201/107 favoring asymmetric vessels or scar.       1. Borderline prominent fluid content within the GI tract which could indicate nonspecific enterocolitis. 2. Liver steatosis.     MACRO: None   Signed by: Bulmaro Valle 4/7/2025 11:43 AM Dictation workstation:   [id number]      Results for orders placed or performed during the hospital encounter of 04/07/25 (from the past 24 hours)   Lactate   Result Value Ref Range    Lactate 2.5 (H) 0.4  - 2.0 mmol/L   SST TOP   Result Value Ref Range    Extra Tube Hold for add-ons.    Lactate   Result Value Ref Range    Lactate 1.5 0.4 - 2.0 mmol/L   CBC and Auto Differential   Result Value Ref Range    WBC 8.3 4.4 - 11.3 x10*3/uL    nRBC 0.0 0.0 - 0.0 /100 WBCs    RBC 3.99 (L) 4.00 - 5.20 x10*6/uL    Hemoglobin 11.3 (L) 12.0 - 16.0 g/dL    Hematocrit 37.5 36.0 - 46.0 %    MCV 94 80 - 100 fL    MCH 28.3 26.0 - 34.0 pg    MCHC 30.1 (L) 32.0 - 36.0 g/dL    RDW 13.0 11.5 - 14.5 %    Platelets 262 150 - 450 x10*3/uL    Neutrophils % 73.4 40.0 - 80.0 %    Immature Granulocytes %, Automated 0.5 0.0 - 0.9 %    Lymphocytes % 17.5 13.0 - 44.0 %    Monocytes % 8.5 2.0 - 10.0 %    Eosinophils % 0.0 0.0 - 6.0 %    Basophils % 0.1 0.0 - 2.0 %    Neutrophils Absolute 6.12 1.20 - 7.70 x10*3/uL    Immature Granulocytes Absolute, Automated 0.04 0.00 - 0.70 x10*3/uL    Lymphocytes Absolute 1.46 1.20 - 4.80 x10*3/uL    Monocytes Absolute 0.71 0.10 - 1.00 x10*3/uL    Eosinophils Absolute 0.00 0.00 - 0.70 x10*3/uL    Basophils Absolute 0.01 0.00 - 0.10 x10*3/uL   Basic metabolic panel   Result Value Ref Range    Glucose 105 (H) 74 - 99 mg/dL    Sodium 138 136 - 145 mmol/L    Potassium 3.1 (L) 3.5 - 5.3 mmol/L    Chloride 107 98 - 107 mmol/L    Bicarbonate 23 21 - 32 mmol/L    Anion Gap 11 10 - 20 mmol/L    Urea Nitrogen 7 6 - 23 mg/dL    Creatinine 0.71 0.50 - 1.05 mg/dL    eGFR >90 >60 mL/min/1.73m*2    Calcium 7.4 (L) 8.6 - 10.3 mg/dL   Magnesium   Result Value Ref Range    Magnesium 1.70 1.60 - 2.40 mg/dL   SST TOP   Result Value Ref Range    Extra Tube Hold for add-ons.              Assessment/Plan   Assessment & Plan  Gastroenteritis    Graciela Maher is a 34 y.o. female with past medical history 1 prior , ADHD, generalized anxiety disorder, PCOS presenting with bilious vomiting and abdominal pain since yesterday.  Patient denies any sick contacts, any changes in her diet.  She is actively vomiting in the ED and  history is challenging to obtain secondary to this.  Per chart review patient thinks she may have had a miscarriage last week as she had heavy bleeding.    Progress  25: Had fever last night 100.8F, having diarrhea, consult GI given N/V/D to establish care. Continue abx. Order stool testing.      #Sepsis secondary to enterocolitis & cystitis   #Leukocytosis secondary to above, improved  #Lactic acidosis secondary to above, resolved   #Abdominal pain secondary to above  #Uncontrollable vomiting  #Sinus tachycardia secondary to sepsis  #Diarrhea     - Cover with Zosyn, IV fluids, advance diet as tolerated  - Trend CBC   - Monitor telemetry, expect sinus tachycardia improve as the patient receives treatment for her infection  -DVT prophylaxis with Lovenox  -Patient thinks she may have had a miscarriage last week, pregnancy test is negative in the ED, monitor blood counts  -GI consult      Chronic conditions:  #Generalized anxiety disorder  #History of   #Prior history of metformin use  - Last A1c 5.7 on 3/7/2024, patient no longer taking metformin at this time, monitor morning sugars with BMP     Romeo Roa DO

## 2025-04-08 NOTE — CONSULTS
The group called a very ER reason For Consult  Recurrent nausea/vomiting young pt, possible autoimmune process?     History Of Present Illness  Graciela Maher is a 34 y.o. female   with past medical history 1 prior , ADHD, generalized anxiety disorder, PCOS who presented to [facility]/ with bilious vomiting and abdominal pain since the day before.  Patient denies any sick contacts, any changes in her diet.  She was actively vomiting in the ED and history gathering was challenging secondary to this.      Today on examination at bedside patient reports feeling quite better and would like to eat regular food.  Still has mild crampy discomfort above the navel but states significantly better today, no nausea/vomiting.  Denies any nausea or vomiting prior to current episode.  Reports having 1 loose stool yesterday and 2 episodes today, nonbloody.  Previously bowel habits daily with negative straining  Denies any anticoagulation  No NSAIDs  No previous EGD/colonoscopy  Lab work today shows H&H 11.3, serum potassium 3.1, LFTs unremarkable.    CT of abdomen and pelvis with IV contrast showed borderline prominent fluid content within seen the GI tract which could indicate nonspecific enterocolitis, liver steatosis, otherwise unremarkable from a GI perspective.  Please see full official report for additional findings.    Past Medical History  She has a past medical history of Other conditions influencing health status and Personal history of other mental and behavioral disorders.    Surgical History  She has no past surgical history on file.     Social History  She reports that she has never smoked. She has never used smokeless tobacco. No history on file for alcohol use and drug use.    Family History  No family history on file.     Allergies  Patient has no known allergies.    Review of Systems    A 10 point review of system is negative except for what is mentioned in the HPI     Physical Exam     The note was  "created using voice recognition transcription software. Despite proofreading, unintentional typographical errors may be present. Please contact the GI office with any questions or concerns.     Current Medications: reviewed    Vital Signs: Reviewed    Physical Exam:  General: no apparent distress.  Obese  Skin:  Warm and dry, no jaundice  HEENT: No scleral icterus, no conjunctival pallor, normocephalic, atraumatic, mucous membranes moist  Neck:  atraumatic, trachea midline, no JVD  Chest:  decreased air entry to auscultation bilaterally. No wheezes, rales, or rhonchi  CV:  Regular rate and rhythm.  Positive S1/S2  Abdomen: no distension, +BS, soft, non-tender to palpation, no rebound tenderness, no guarding, no rigidity, no discernible ascites   Extremities: no lower extremity edema, Chronic pigmentary changes, no cyanosis  Neurological:  A&Ox3 , no asterixis  Psychiatric: cooperative     Investigations:  Labs, radiological imaging and cardiac work up were reviewed     Last Recorded Vitals  Blood pressure 101/62, pulse 91, temperature 36.5 °C (97.7 °F), resp. rate 18, height 1.6 m (5' 3\"), weight 95.3 kg (210 lb), SpO2 96%.    Relevant Results      Scheduled medications  enoxaparin, 40 mg, subcutaneous, q24h  piperacillin-tazobactam, 3.375 g, intravenous, q6h      Continuous medications  sodium chloride 0.9%, 125 mL/hr, Last Rate: 125 mL/hr (04/08/25 1025)      PRN medications  PRN medications: acetaminophen **OR** acetaminophen, prochlorperazine **OR** prochlorperazine **OR** prochlorperazine, promethazine **OR** promethazine    Results for orders placed or performed during the hospital encounter of 04/07/25 (from the past 24 hours)   Lactate   Result Value Ref Range    Lactate 1.5 0.4 - 2.0 mmol/L   CBC and Auto Differential   Result Value Ref Range    WBC 8.3 4.4 - 11.3 x10*3/uL    nRBC 0.0 0.0 - 0.0 /100 WBCs    RBC 3.99 (L) 4.00 - 5.20 x10*6/uL    Hemoglobin 11.3 (L) 12.0 - 16.0 g/dL    Hematocrit 37.5 36.0 - " 46.0 %    MCV 94 80 - 100 fL    MCH 28.3 26.0 - 34.0 pg    MCHC 30.1 (L) 32.0 - 36.0 g/dL    RDW 13.0 11.5 - 14.5 %    Platelets 262 150 - 450 x10*3/uL    Neutrophils % 73.4 40.0 - 80.0 %    Immature Granulocytes %, Automated 0.5 0.0 - 0.9 %    Lymphocytes % 17.5 13.0 - 44.0 %    Monocytes % 8.5 2.0 - 10.0 %    Eosinophils % 0.0 0.0 - 6.0 %    Basophils % 0.1 0.0 - 2.0 %    Neutrophils Absolute 6.12 1.20 - 7.70 x10*3/uL    Immature Granulocytes Absolute, Automated 0.04 0.00 - 0.70 x10*3/uL    Lymphocytes Absolute 1.46 1.20 - 4.80 x10*3/uL    Monocytes Absolute 0.71 0.10 - 1.00 x10*3/uL    Eosinophils Absolute 0.00 0.00 - 0.70 x10*3/uL    Basophils Absolute 0.01 0.00 - 0.10 x10*3/uL   Basic metabolic panel   Result Value Ref Range    Glucose 105 (H) 74 - 99 mg/dL    Sodium 138 136 - 145 mmol/L    Potassium 3.1 (L) 3.5 - 5.3 mmol/L    Chloride 107 98 - 107 mmol/L    Bicarbonate 23 21 - 32 mmol/L    Anion Gap 11 10 - 20 mmol/L    Urea Nitrogen 7 6 - 23 mg/dL    Creatinine 0.71 0.50 - 1.05 mg/dL    eGFR >90 >60 mL/min/1.73m*2    Calcium 7.4 (L) 8.6 - 10.3 mg/dL   Magnesium   Result Value Ref Range    Magnesium 1.70 1.60 - 2.40 mg/dL   SST TOP   Result Value Ref Range    Extra Tube Hold for add-ons.           Assessment/Plan     Graciela Maher is a 34 y.o. female   with past medical history 1 prior , ADHD, generalized anxiety disorder, PCOS who presented to [facility]/ with bilious vomiting and abdominal pain since the day before.      No previous history of EGD/colonoscopy    #Nausea/vomiting, improving  #Abdominal pain, improving  #Diarrhea, nonbloody    Most likely patient experienced a bout of viral gastroenteritis which appears to be improving.  Patient now asks for regular food.  Denies any previous history of nausea/vomiting    Continue daily abdominal exams, CBC, BMP  Agree with stool for C. difficile, path panel, will await for results    Supportive care as per primary medicine    Patient discussed  with Dr. Johnson      I spent 60 minutes in the professional and overall care of this patient.      Angelika Simon, LIAM-CNP      Patient seen and examined.  I reviewed the above note and agree with this contents and plan of action.  I have a few further observations  The patient is an RN.  She did have a pizza taken out from the refrigerator the day before but has sisterly did not she did not get sick the day before she had some whitefish which was in the freezer and she cooked it.  There is no immediate history of anybody else  in the immediate family having a diarrhea  The patient already feels better her abdomen is very soft no tenderness bowel sounds are present more than likely that this is just a garden-variety gastroenteritis the exact etiology may not be clear should await the result of the cultures we will sign off for the time being and be back if requested to do so

## 2025-04-08 NOTE — PROGRESS NOTES
Physical Therapy                 Therapy Communication Note    Patient Name: Graciela Maher  MRN: [id number]  Department: [facility] 6  Room: 603/603-A  Today's Date: 4/8/2025     Discipline: Physical Therapy          PT screen: patient independent w/ all functional mobility & adl's.  No skilled PT needs, will sign off at this time, RN aware.

## 2025-04-08 NOTE — TELEPHONE ENCOUNTER
Received VM stating that this pt needed a consult, I called the number left  stating Dr. Guillen was not on call, that Dr. Pugh was and for them to send a secure chat.

## 2025-04-08 NOTE — PROGRESS NOTES
Sw spoke with pt to see if she has insurance because she shows as being self pay.  Pt states she has Medicaid.  Sw to follow up with pt to see if she was able to find her Medicaid Card to provide to registration.        2:20 pm- Pt states that she does not have her Medicaid Card on her and she has no one that is able to provide the Member ID number.  Pt states she will call in with the information once DC.

## 2025-04-09 VITALS
HEART RATE: 84 BPM | SYSTOLIC BLOOD PRESSURE: 117 MMHG | DIASTOLIC BLOOD PRESSURE: 61 MMHG | HEIGHT: 63 IN | RESPIRATION RATE: 19 BRPM | WEIGHT: 210 LBS | OXYGEN SATURATION: 97 % | TEMPERATURE: 98.6 F | BODY MASS INDEX: 37.21 KG/M2

## 2025-04-09 PROBLEM — K52.9 GASTROENTERITIS: Status: RESOLVED | Noted: 2025-04-07 | Resolved: 2025-04-09

## 2025-04-09 LAB
ANION GAP SERPL CALC-SCNC: 12 MMOL/L (ref 10–20)
ATRIAL RATE: 116 BPM
BASOPHILS # BLD AUTO: 0.02 X10*3/UL (ref 0–0.1)
BASOPHILS NFR BLD AUTO: 0.3 %
BUN SERPL-MCNC: 3 MG/DL (ref 6–23)
C COLI+JEJ+UPSA DNA STL QL NAA+PROBE: NOT DETECTED
C DIF TOX TCDA+TCDB STL QL NAA+PROBE: NOT DETECTED
CALCIUM SERPL-MCNC: 7.9 MG/DL (ref 8.6–10.3)
CHLORIDE SERPL-SCNC: 109 MMOL/L (ref 98–107)
CO2 SERPL-SCNC: 21 MMOL/L (ref 21–32)
CREAT SERPL-MCNC: 0.56 MG/DL (ref 0.5–1.05)
EC STX1 GENE STL QL NAA+PROBE: NOT DETECTED
EC STX2 GENE STL QL NAA+PROBE: NOT DETECTED
EGFRCR SERPLBLD CKD-EPI 2021: >90 ML/MIN/1.73M*2
EOSINOPHIL # BLD AUTO: 0.08 X10*3/UL (ref 0–0.7)
EOSINOPHIL NFR BLD AUTO: 1.1 %
ERYTHROCYTE [DISTWIDTH] IN BLOOD BY AUTOMATED COUNT: 12.8 % (ref 11.5–14.5)
FERRITIN SERPL-MCNC: 150 NG/ML (ref 8–150)
GLUCOSE SERPL-MCNC: 102 MG/DL (ref 74–99)
HCT VFR BLD AUTO: 37.6 % (ref 36–46)
HGB BLD-MCNC: 11.4 G/DL (ref 12–16)
IMM GRANULOCYTES # BLD AUTO: 0.02 X10*3/UL (ref 0–0.7)
IMM GRANULOCYTES NFR BLD AUTO: 0.3 % (ref 0–0.9)
LYMPHOCYTES # BLD AUTO: 1.89 X10*3/UL (ref 1.2–4.8)
LYMPHOCYTES NFR BLD AUTO: 26 %
MCH RBC QN AUTO: 28.1 PG (ref 26–34)
MCHC RBC AUTO-ENTMCNC: 30.3 G/DL (ref 32–36)
MCV RBC AUTO: 93 FL (ref 80–100)
MONOCYTES # BLD AUTO: 0.71 X10*3/UL (ref 0.1–1)
MONOCYTES NFR BLD AUTO: 9.8 %
NEUTROPHILS # BLD AUTO: 4.55 X10*3/UL (ref 1.2–7.7)
NEUTROPHILS NFR BLD AUTO: 62.5 %
NOROVIRUS GI + GII RNA STL NAA+PROBE: DETECTED
NRBC BLD-RTO: 0 /100 WBCS (ref 0–0)
P AXIS: 21 DEGREES
P OFFSET: 206 MS
P ONSET: 158 MS
PLATELET # BLD AUTO: 221 X10*3/UL (ref 150–450)
POTASSIUM SERPL-SCNC: 3.7 MMOL/L (ref 3.5–5.3)
PR INTERVAL: 130 MS
Q ONSET: 223 MS
QRS COUNT: 19 BEATS
QRS DURATION: 76 MS
QT INTERVAL: 326 MS
QTC CALCULATION(BAZETT): 453 MS
QTC FREDERICIA: 406 MS
R AXIS: -4 DEGREES
RBC # BLD AUTO: 4.05 X10*6/UL (ref 4–5.2)
RV RNA STL NAA+PROBE: NOT DETECTED
SALMONELLA DNA STL QL NAA+PROBE: NOT DETECTED
SHIGELLA DNA SPEC QL NAA+PROBE: NOT DETECTED
SODIUM SERPL-SCNC: 138 MMOL/L (ref 136–145)
T AXIS: -9 DEGREES
T OFFSET: 386 MS
V CHOLERAE DNA STL QL NAA+PROBE: NOT DETECTED
VENTRICULAR RATE: 116 BPM
WBC # BLD AUTO: 7.3 X10*3/UL (ref 4.4–11.3)
Y ENTEROCOL DNA STL QL NAA+PROBE: NOT DETECTED

## 2025-04-09 PROCEDURE — 2500000001 HC RX 250 WO HCPCS SELF ADMINISTERED DRUGS (ALT 637 FOR MEDICARE OP)

## 2025-04-09 PROCEDURE — 2500000004 HC RX 250 GENERAL PHARMACY W/ HCPCS (ALT 636 FOR OP/ED)

## 2025-04-09 PROCEDURE — 85025 COMPLETE CBC W/AUTO DIFF WBC: CPT

## 2025-04-09 PROCEDURE — 36415 COLL VENOUS BLD VENIPUNCTURE: CPT

## 2025-04-09 PROCEDURE — 80048 BASIC METABOLIC PNL TOTAL CA: CPT

## 2025-04-09 PROCEDURE — 99239 HOSP IP/OBS DSCHRG MGMT >30: CPT | Performed by: STUDENT IN AN ORGANIZED HEALTH CARE EDUCATION/TRAINING PROGRAM

## 2025-04-09 RX ORDER — ZINC OXIDE 20 G/100G
1 OINTMENT TOPICAL
Status: DISCONTINUED | OUTPATIENT
Start: 2025-04-09 | End: 2025-04-09 | Stop reason: HOSPADM

## 2025-04-09 RX ORDER — CALCIUM CARBONATE 200(500)MG
1000 TABLET,CHEWABLE ORAL 4 TIMES DAILY PRN
Status: DISCONTINUED | OUTPATIENT
Start: 2025-04-09 | End: 2025-04-09 | Stop reason: HOSPADM

## 2025-04-09 RX ADMIN — ZINC OXIDE 1 APPLICATION: 200 OINTMENT TOPICAL at 09:48

## 2025-04-09 RX ADMIN — PIPERACILLIN SODIUM AND TAZOBACTAM SODIUM 3.38 G: 3; .375 INJECTION, SOLUTION INTRAVENOUS at 04:26

## 2025-04-09 RX ADMIN — ACETAMINOPHEN 650 MG: 325 TABLET, FILM COATED ORAL at 10:43

## 2025-04-09 RX ADMIN — PIPERACILLIN SODIUM AND TAZOBACTAM SODIUM 3.38 G: 3; .375 INJECTION, SOLUTION INTRAVENOUS at 09:52

## 2025-04-09 RX ADMIN — SODIUM CHLORIDE 125 ML/HR: 9 INJECTION, SOLUTION INTRAVENOUS at 03:32

## 2025-04-09 RX ADMIN — CALCIUM CARBONATE (ANTACID) CHEW TAB 500 MG 1000 MG: 500 CHEW TAB at 09:48

## 2025-04-09 ASSESSMENT — PAIN - FUNCTIONAL ASSESSMENT: PAIN_FUNCTIONAL_ASSESSMENT: 0-10

## 2025-04-09 ASSESSMENT — COGNITIVE AND FUNCTIONAL STATUS - GENERAL
MOBILITY SCORE: 24
DAILY ACTIVITIY SCORE: 24

## 2025-04-09 ASSESSMENT — PAIN DESCRIPTION - LOCATION: LOCATION: HEAD

## 2025-04-09 ASSESSMENT — PAIN SCALES - GENERAL
PAINLEVEL_OUTOF10: 6
PAINLEVEL_OUTOF10: 5 - MODERATE PAIN

## 2025-04-09 NOTE — CARE PLAN
The patient's goals for the shift include      The clinical goals for the shift include      Over the shift, the patient did make progress toward the following goals.    yes

## 2025-04-09 NOTE — DISCHARGE SUMMARY
Discharge Diagnosis  #Sepsis secondary to enterocolitis & cystitis   #Leukocytosis secondary to above, improved  #Lactic acidosis secondary to above, resolved   #Abdominal pain secondary to above  #Uncontrollable vomiting  #Sinus tachycardia secondary to sepsis  #Norovirus Diarrhea    Issues Requiring Follow-Up  Make sure to wash your hands with soap and water to prevent the spread of norovirus.  I provided a return to work note for you in your chart.    Discharge Meds     Medication List      CONTINUE taking these medications     busPIRone 10 mg tablet; Commonly known as: Buspar   metFORMIN 1,000 mg tablet; Commonly known as: Glucophage   methylphenidate ER 54 mg extended release tablet   ondansetron 4 mg tablet; Commonly known as: Zofran       Test Results Pending At Discharge  Pending Labs       Order Current Status    Blood Culture Preliminary result    Blood Culture Preliminary result            Hospital Course   rGaciela Maher is a 34 y.o. female with past medical history 1 prior , ADHD, generalized anxiety disorder, PCOS presenting with bilious vomiting and abdominal pain since yesterday. Patient denies any sick contacts, any changes in her diet. She was actively vomiting in the ED and history is challenging to obtain secondary to this. Per chart review patient thinks she may have had a miscarriage last week as she had heavy bleeding.  Pregnancy test negative in the ED. CT abdomen pelvis reviewed and demonstrated nonspecific enterocolitis.  Patient's nausea improved with antiemetics during her hospital course.  Her leukocytosis which was 19 in the ED improved to normal levels throughout her hospital course.  She was found to be norovirus positive.  She was discharged home in hemodynamically stable condition tolerating diet without issue.    Pertinent Physical Exam At Time of Discharge  Physical Exam  Constitutional:       General: She is not in acute distress.  HENT:      Head: Normocephalic and  atraumatic.   Eyes:      Extraocular Movements: Extraocular movements intact.      Pupils: Pupils are equal, round, and reactive to light.   Cardiovascular:      Rate and Rhythm: Normal rate and regular rhythm.   Pulmonary:      Effort: Pulmonary effort is normal.      Breath sounds: Normal breath sounds.   Abdominal:      General: Abdomen is flat.      Palpations: Abdomen is soft.   Neurological:      Mental Status: She is alert.   Psychiatric:         Mood and Affect: Mood normal.         Behavior: Behavior normal.         Outpatient Follow-Up  No future appointments.      Romeo Roa DO

## 2025-04-09 NOTE — CARE PLAN
The patient's goals for the shift include      The clinical goals for the shift include      Over the shift, the patient did make progress toward the following goals.

## 2025-04-10 ENCOUNTER — PATIENT OUTREACH (OUTPATIENT)
Dept: PRIMARY CARE | Facility: CLINIC | Age: 35
End: 2025-04-10

## 2025-04-10 NOTE — PROGRESS NOTES
Discharge Facility: [facility]  Discharge Diagnosis: Gastroenteritis  Admission Date: 4/7/25  Discharge Date: 4/9/25    PCP Appointment Date: Pt declined. Task to office.  Specialist Appointment Date: Unknown  Hospital Encounter and Summary Linked: Yes    Discharge Summary by Romeo Roa DO (04/09/2025 13:44)   See discharge assessment below for further details    Wrap Up  Wrap Up Additional Comments: This CM spoke with pt via phone. Pt reports doing well at home since discharge. New meds reviewed. Pt denies CP and SOB. Pt aware of my availability for non-emergent concerns. Contact info provided to patient (4/10/2025  9:49 AM)    Engagement  Call Start Time: 0945 (4/10/2025  9:49 AM)    Medications  Medications reviewed with patient/caregiver?: Not applicable (4/10/2025  9:49 AM)  Does the patient have all medications ordered at discharge?: Not applicable (4/10/2025  9:49 AM)  Care Management Interventions: No intervention needed (4/10/2025  9:49 AM)  Prescription Comments: n/a (4/10/2025  9:49 AM)  Is the patient taking all medications as directed (includes completed medication regime)?: Not applicable (4/10/2025  9:49 AM)  Care Management Interventions: Provided patient education (4/10/2025  9:49 AM)  Medication Comments: n/a (4/10/2025  9:49 AM)    Appointments  Does the patient have a primary care provider?: Yes (pt declined. task to office.) (4/10/2025  9:49 AM)  Care Management Interventions: Educated patient on importance of making appointment (4/10/2025  9:49 AM)  Has the patient kept scheduled appointments due by today?: Yes (4/10/2025  9:49 AM)  Care Management Interventions: Educated on importance of keeping appointment (4/10/2025  9:49 AM)    Self Management  What is the home health agency?: n/a (4/10/2025  9:49 AM)  Has home health visited the patient within 72 hours of discharge?: Not applicable (4/10/2025  9:49 AM)    Patient Teaching  Does the patient have access to their discharge  instructions?: Yes (4/10/2025  9:49 AM)  Care Management Interventions: Reviewed instructions with patient (4/10/2025  9:49 AM)  What is the patient's perception of their health status since discharge?: Improving (4/10/2025  9:49 AM)  Is the patient/caregiver able to teach back the hierarchy of who to call/visit for symptoms/problems? PCP, Specialist, Home Health nurse, Urgent Care, ED, 911: Yes (4/10/2025  9:49 AM)      Joao Shanks LPN

## 2025-04-11 LAB
BACTERIA BLD CULT: NORMAL
BACTERIA BLD CULT: NORMAL

## 2025-04-22 ENCOUNTER — PATIENT OUTREACH (OUTPATIENT)
Dept: PRIMARY CARE | Facility: CLINIC | Age: 35
End: 2025-04-22
Payer: COMMERCIAL

## 2025-04-22 NOTE — PROGRESS NOTES
Unable to reach patient for follow up call after recent hospitalization.   Left voicemail with call back number for patient to call if needed   If no voicemail available call attempts x 2 were made to contact the patient to assist with any questions or concerns patient may have.    Joao Shanks LPN

## 2025-05-08 ENCOUNTER — PATIENT OUTREACH (OUTPATIENT)
Dept: PRIMARY CARE | Facility: CLINIC | Age: 35
End: 2025-05-08
Payer: COMMERCIAL

## 2025-06-16 ENCOUNTER — E-VISIT (OUTPATIENT)
Dept: PRIMARY CARE | Facility: CLINIC | Age: 35
End: 2025-06-16
Payer: COMMERCIAL

## 2025-06-16 DIAGNOSIS — R39.9 UTI SYMPTOMS: Primary | ICD-10-CM

## 2025-06-16 RX ORDER — NITROFURANTOIN 25; 75 MG/1; MG/1
100 CAPSULE ORAL 2 TIMES DAILY
Qty: 10 CAPSULE | Refills: 0 | Status: SHIPPED | OUTPATIENT
Start: 2025-06-16 | End: 2025-06-21

## 2025-06-16 NOTE — TELEPHONE ENCOUNTER
Concern for UTI:  No N,V,D  No back pain  No CVA TTP  No abdominal pain  No pelvic pain  No blood in urine  No fever  No kidney or bladder infections in last 12 months  No prior kidney stones or surgery  No STI concerns  No genital sores  Not pregnant    +frequency  +urgency    Sx onset 4 days ago    Check urine culture  No

## 2025-07-15 ENCOUNTER — APPOINTMENT (OUTPATIENT)
Dept: OTOLARYNGOLOGY | Facility: CLINIC | Age: 35
End: 2025-07-15
Payer: COMMERCIAL

## 2025-07-15 VITALS — HEIGHT: 63 IN | WEIGHT: 215 LBS | BODY MASS INDEX: 38.09 KG/M2

## 2025-07-15 DIAGNOSIS — J03.91 RECURRENT TONSILLITIS: Primary | ICD-10-CM

## 2025-07-15 DIAGNOSIS — K21.9 LARYNGOPHARYNGEAL REFLUX (LPR): ICD-10-CM

## 2025-07-15 DIAGNOSIS — J35.8 TONSIL STONE: ICD-10-CM

## 2025-07-15 DIAGNOSIS — J35.1 TONSILLAR HYPERTROPHY: ICD-10-CM

## 2025-07-15 DIAGNOSIS — J35.8 TONSILLAR CYST: ICD-10-CM

## 2025-07-15 PROCEDURE — 99203 OFFICE O/P NEW LOW 30 MIN: CPT

## 2025-07-15 PROCEDURE — 3008F BODY MASS INDEX DOCD: CPT

## 2025-07-15 RX ORDER — MAGNESIUM CARB/ALUMINUM HYDROX 105-160MG
2 TABLET,CHEWABLE ORAL NIGHTLY
Qty: 60 TABLET | Refills: 2 | Status: SHIPPED | OUTPATIENT
Start: 2025-07-15 | End: 2025-08-14

## 2025-07-15 ASSESSMENT — PATIENT HEALTH QUESTIONNAIRE - PHQ9
1. LITTLE INTEREST OR PLEASURE IN DOING THINGS: NOT AT ALL
2. FEELING DOWN, DEPRESSED OR HOPELESS: NOT AT ALL
SUM OF ALL RESPONSES TO PHQ9 QUESTIONS 1 AND 2: 0

## 2025-07-17 NOTE — PROGRESS NOTES
Reason For Consult  Chief Complaint   Patient presents with    New Patient Visit     Cyst on right tonsil  Cyst has grown  Tonsils stones   Neck hurt        HISTORY OF PRESENT ILLNESS:  Graciela Maher, who is a 34 y.o. female presenting for an initial visit for tonsillar hypertrophy, frequent tonsil stones, and recurrent tonsillitis.  The patient reports that she has been experiencing episodes of tonsillitis every 1-2 months or 6+ times over the past year. Patient reports that she has been experiencing multiple episodes of tonsillitis in the past several years as well. Patient reports that it will usually take a couple weeks for the swelling of tonsils to reduce to baseline. Patient reports bilateral tonsil stones that will present frequently. Patient reports that she has a spot on the right tonsil that has been there for months. Patient reports some neck discomfort. Patient reports experiencing episodes when lying down at night she will awaken choking on phlegm./acid reflux. Patient denies episodes of apnea or snoring. Patient reports voice has been stable. Patient reports voice is stable. Patient reports breathing is stable. Patient able to tolerate solids, liquids, and pills when swallowing.        Past Medical History  She has a past medical history of Other conditions influencing health status and Personal history of other mental and behavioral disorders.  Surgical History  She has no past surgical history on file.     Social History  She reports that she has never smoked. She has never been exposed to tobacco smoke. She uses smokeless tobacco. No history on file for alcohol use and drug use.    Allergies  Patient has no known allergies.    Review of Systems  All 10 systems were reviewed and negative except for above.      Physical Exam  CONSTITUTIONAL: Well developed, well nourished.    VOICE: normal  RESPIRATION: Breathing comfortably, no stridor.    NEURO: Alert and oriented x3, cranial nerves II-XII  "intact and symmetric bilaterally.    EARS: Normal external ears, external auditory canals, normal hearing to conversational voice.    NOSE: External nose midline, anterior rhinoscopy is normal with limited visualization to the anterior aspect of the interior turbinates. No lesions noted.     ORAL CAVITY/OROPHARYNX/LIPS: Normal mucous membranes, normal floor of mouth/tongue/OP, no masses or lesions are noted.    SKIN: Neck skin is intact scar or injury.    PSYCH: Alert and oriented with appropriate mood and affect.        Last Recorded Vitals  Height 1.6 m (5' 3\"), weight 97.5 kg (215 lb).        Procedure well tolerated.     ASSESSMENT AND PLAN:   This is an initial visit for recurrent tonsillitis, frequent tonsil stones, right palantine tonsil mucus retention cyst, and LPR. Will have the patient seen by Dr. Srivastava to discuss tonsillectomy due to the recurrent tonsillitis and tonsil stones. Patient to do a virtual visit and scheduled at . Prescription sent for gaviscon 2 tablets before bed for LPR. Patient agreeable to plan. All questions answered.     "

## 2025-07-24 ENCOUNTER — APPOINTMENT (OUTPATIENT)
Dept: OTOLARYNGOLOGY | Facility: CLINIC | Age: 35
End: 2025-07-24
Payer: COMMERCIAL

## 2025-07-24 DIAGNOSIS — K21.9 LARYNGOPHARYNGEAL REFLUX (LPR): ICD-10-CM

## 2025-07-24 DIAGNOSIS — J03.91 RECURRENT TONSILLITIS: Primary | ICD-10-CM

## 2025-07-24 DIAGNOSIS — J35.1 TONSILLAR HYPERTROPHY: ICD-10-CM

## 2025-07-24 NOTE — PROGRESS NOTES
ASSESSMENT AND PLAN:   Graciela Maher is a 34 y.o. female with a history of chronic tonsillitis and tonsilliths on exam with significant crypts, likely harboring some bacteria overgrowth. The risks, indications, and complications of a tonsillectomy were discussed with the patient. She would like to proceed.        Assessment & Plan  Patient presents with chronic tonsillitis and a history of recurrent tonsillitis, episodes every 1-2 months, at least six episodes in the past year. Examination revealed significant cryptic tonsils with likely bacterial overgrowth. We discussed options for management to include tonsillectomy.     Treatment plan:   - Tonsillectomy to be scheduled at a convenient time, likely within this year.  - Pain management and hydration post-surgery.    Clinical decision making:   - Risks include bleeding (3-5%), pain for 1.5-2 weeks, and rare potential voice changes.  - Precautions include avoiding exercise and poking the back of the throat post-surgery to minimize bleeding risk.  - Discussed risks, benefits, and alternatives of tonsillectomy.  - Denies bleeding Hx (self/family).  Discussed avoidance of blood thinners.     Follow-up:   - Procedure will be scheduled at a convenient time, likely within this year.      Reason For Consult  No chief complaint on file.       HISTORY OF PRESENT ILLNESS:  Graciela Maher is a 34 y.o. female presenting for a follow up visit with me for tonsillar hypertrophy. This has been present for a couple of years. She has recurrent tonsillitis on a monthly basis. No RADHA. No personal or family history of bleeding disorders.     Prior History:   34 y.o. female presenting for an initial visit for tonsillar hypertrophy, frequent tonsil stones, and recurrent tonsillitis.  The patient reports that she has been experiencing episodes of tonsillitis every 1-2 months or 6+ times over the past year. Patient reports that she has been experiencing multiple episodes of  tonsillitis in the past several years as well. Patient reports that it will usually take a couple weeks for the swelling of tonsils to reduce to baseline. Patient reports bilateral tonsil stones that will present frequently. Patient reports that she has a spot on the right tonsil that has been there for months. Patient reports some neck discomfort. Patient reports experiencing episodes when lying down at night she will awaken choking on phlegm./acid reflux. Patient denies episodes of apnea or snoring. Patient reports voice has been stable. Patient reports voice is stable. Patient reports breathing is stable. Patient able to tolerate solids, liquids, and pills when swallowing.     recurrent tonsillitis, frequent tonsil stones, right palantine tonsil mucus retention cyst, and LPR. Will have the patient seen by Dr. Srivastava to discuss tonsillectomy due to the recurrent tonsillitis and tonsil stones. Patient to do a virtual visit and scheduled at . Prescription sent for gaviscon 2 tablets before bed for LPR. Patient agreeable to plan.     History of Present Illness  The patient is a 34-year-old female who has been dealing with tonsil issues for a while.    Enlarged Tonsils and Tonsillitis  - History of enlarged tonsils, tonsil stones, and frequent bouts of tonsillitis  - Gets tonsillitis every 1-2 months, with at least 6 episodes over the past year  - Takes several weeks to feel better, with no symptoms in between  - Spot on right tonsil that's been there for many months  - Mild neck discomfort with chronic sore throats  - No sleep apnea, snoring, or changes in voice  - Diagnosed with a cyst on right tonsil and LPR  - Prescribed Gaviscon, 2 tablets before bed    Tonsil Stones  - Had tonsil stones for the past couple of years  - This year has been particularly bad, occurring about once a month  - Considering a tonsillectomy and wants to know more about the procedure    Supplemental Information  - No symptoms of  sleep apnea or snoring  - No personal or family history of bleeding disorders or sickle cell disease  - Eager to schedule the procedure soon because she is starting a new job on 08/11/2025 and doesn't want to take 2 weeks off work    FAMILY HISTORY  - No family history of bleeding disorders or sickle cell disease         Past Medical History  She has a past medical history of Other conditions influencing health status and Personal history of other mental and behavioral disorders. Surgical History  She has no past surgical history on file.   Social History  She reports that she has never smoked. She has never been exposed to tobacco smoke. She uses smokeless tobacco. No history on file for alcohol use and drug use. Allergies  Patient has no known allergies.     Family History  Family History[1]    Review of Systems  All 10 systems were reviewed and negative except for above.      Last Recorded Vitals  There were no vitals taken for this visit.    Physical Exam  Oral Cavity: Significant crypts in the tonsils with tonsilloliths. Right tonsil retention cyst noted.  Throat: Chronic tonsillitis with hypertrophy and recurrent tonsil stones.  Neck: Mild neck discomfort.    Results          Time Spent  Prep time on day of patient encounter: 10 minutes  Time spent directly with patient, family or caregiver: 15 minutes  Additional Time Spent on Patient Care Activities/Discussion with SLP re care plan: 5 minutes  Documentation Time: 10 minutes  Other Time Spent: 0 minutes  Total: 40 minutes     Scribe Attestation  By signing my name below, ILinsey , Scribspencer attest that this documentation has been prepared under the direction and in the presence of Masood Srivastava MD.      This medical note was created with the assistance of artificial intelligence (AI) for documentation purposes. The content has been reviewed and confirmed by the healthcare provider for accuracy and completeness. Patient consented to the use of audio  recording and use of AI during their visit.          [1] No family history on file.

## 2025-08-05 DIAGNOSIS — J35.1 TONSILLAR HYPERTROPHY: ICD-10-CM

## 2025-08-05 DIAGNOSIS — J03.91 RECURRENT TONSILLITIS: ICD-10-CM

## 2025-09-02 ENCOUNTER — TELEPHONE (OUTPATIENT)
Dept: PRIMARY CARE | Facility: CLINIC | Age: 35
End: 2025-09-02
Payer: COMMERCIAL

## 2025-09-02 DIAGNOSIS — N91.2 AMENORRHEA: Primary | ICD-10-CM

## 2025-09-03 LAB — B-HCG SERPL-ACNC: <5 MIU/ML

## 2025-11-06 ENCOUNTER — APPOINTMENT (OUTPATIENT)
Dept: OTOLARYNGOLOGY | Facility: CLINIC | Age: 35
End: 2025-11-06
Payer: COMMERCIAL